# Patient Record
Sex: FEMALE | Race: OTHER | HISPANIC OR LATINO | Employment: FULL TIME | URBAN - METROPOLITAN AREA
[De-identification: names, ages, dates, MRNs, and addresses within clinical notes are randomized per-mention and may not be internally consistent; named-entity substitution may affect disease eponyms.]

---

## 2022-02-09 ENCOUNTER — OFFICE VISIT (OUTPATIENT)
Dept: FAMILY MEDICINE CLINIC | Facility: CLINIC | Age: 42
End: 2022-02-09
Payer: COMMERCIAL

## 2022-02-09 VITALS
BODY MASS INDEX: 31.45 KG/M2 | RESPIRATION RATE: 16 BRPM | SYSTOLIC BLOOD PRESSURE: 118 MMHG | HEIGHT: 64 IN | TEMPERATURE: 98 F | DIASTOLIC BLOOD PRESSURE: 70 MMHG | HEART RATE: 80 BPM | WEIGHT: 184.25 LBS

## 2022-02-09 DIAGNOSIS — E66.09 CLASS 1 OBESITY DUE TO EXCESS CALORIES WITHOUT SERIOUS COMORBIDITY WITH BODY MASS INDEX (BMI) OF 31.0 TO 31.9 IN ADULT: ICD-10-CM

## 2022-02-09 DIAGNOSIS — Z00.00 HEALTHCARE MAINTENANCE: Primary | ICD-10-CM

## 2022-02-09 DIAGNOSIS — E55.9 VITAMIN D INSUFFICIENCY: ICD-10-CM

## 2022-02-09 DIAGNOSIS — Z12.31 ENCOUNTER FOR SCREENING MAMMOGRAM FOR MALIGNANT NEOPLASM OF BREAST: ICD-10-CM

## 2022-02-09 PROBLEM — E66.811 CLASS 1 OBESITY DUE TO EXCESS CALORIES WITHOUT SERIOUS COMORBIDITY WITH BODY MASS INDEX (BMI) OF 31.0 TO 31.9 IN ADULT: Status: ACTIVE | Noted: 2022-02-09

## 2022-02-09 PROCEDURE — 3725F SCREEN DEPRESSION PERFORMED: CPT | Performed by: FAMILY MEDICINE

## 2022-02-09 PROCEDURE — 3008F BODY MASS INDEX DOCD: CPT | Performed by: FAMILY MEDICINE

## 2022-02-09 PROCEDURE — 1036F TOBACCO NON-USER: CPT | Performed by: FAMILY MEDICINE

## 2022-02-09 PROCEDURE — 99386 PREV VISIT NEW AGE 40-64: CPT | Performed by: FAMILY MEDICINE

## 2022-02-09 RX ORDER — PHENTERMINE HYDROCHLORIDE 37.5 MG/1
37.5 CAPSULE ORAL EVERY MORNING
Qty: 30 CAPSULE | Refills: 0 | Status: SHIPPED | OUTPATIENT
Start: 2022-02-09 | End: 2022-03-14

## 2022-02-09 NOTE — PROGRESS NOTES
Assessment/Plan:  Healthcare maintenance  It was discussed about immunizations, diet, exercise and safety measures  Class 1 obesity due to excess calories without serious comorbidity with body mass index (BMI) of 31 0 to 31 9 in adult  It was discussed about low carb diet and regular exercise  It was discussed about treatment options  She agreed to start on phentermine 37 5 mg daily  Discussed about possible side effects  Come back in 1 month for follow-up  Diagnoses and all orders for this visit:    Healthcare maintenance  -     CBC and differential; Future  -     Comprehensive metabolic panel; Future  -     Lipid Panel with Direct LDL reflex; Future  -     TSH, 3rd generation with Free T4 reflex; Future    Encounter for screening mammogram for malignant neoplasm of breast  -     Mammo screening bilateral w 3d & cad; Future    Vitamin D insufficiency  -     Vitamin D 25 hydroxy; Future    Class 1 obesity due to excess calories without serious comorbidity with body mass index (BMI) of 31 0 to 31 9 in adult  -     phentermine 37 5 MG capsule; Take 1 capsule (37 5 mg total) by mouth every morning    BMI 31 0-31 9,adult          There are no Patient Instructions on file for this visit  Return in about 1 month (around 3/9/2022)  Subjective:      Patient ID: Karma Mondragon is a 39 y o  female  Chief Complaint   Patient presents with   [facility]     Physical Exam       She is here today as a new patient to establish and for wellness exam   She denies any complain  She has been having problems with weight gain  She does exercise 5 days a week for 1/2 hour  She has not been doing well with her diet  The following portions of the patient's history were reviewed and updated as appropriate: allergies, current medications, past family history, past medical history, past social history, past surgical history and problem list     Review of Systems   Constitutional: Negative for chills and fever  HENT: Negative for trouble swallowing  Eyes: Negative for visual disturbance  Respiratory: Negative for cough and shortness of breath  Cardiovascular: Negative for chest pain, palpitations and leg swelling  Gastrointestinal: Negative for abdominal pain, constipation and diarrhea  Endocrine: Negative for cold intolerance and heat intolerance  Genitourinary: Negative for difficulty urinating and dysuria  Musculoskeletal: Negative for gait problem  Skin: Negative for rash  Neurological: Negative for dizziness, tremors, seizures and headaches  Hematological: Negative for adenopathy  Psychiatric/Behavioral: Negative for behavioral problems  Current Outpatient Medications   Medication Sig Dispense Refill    phentermine 37 5 MG capsule Take 1 capsule (37 5 mg total) by mouth every morning 30 capsule 0     No current facility-administered medications for this visit  Objective:    /70 (BP Location: Left arm, Patient Position: Sitting, Cuff Size: Adult)   Pulse 80   Temp 98 °F (36 7 °C) (Tympanic)   Resp 16   Ht 5' 4" (1 626 m)   Wt 83 6 kg (184 lb 4 oz)   BMI 31 63 kg/m²        Physical Exam  Vitals and nursing note reviewed  Constitutional:       Appearance: Normal appearance  She is well-developed  HENT:      Head: Normocephalic and atraumatic  Eyes:      Pupils: Pupils are equal, round, and reactive to light  Cardiovascular:      Rate and Rhythm: Normal rate and regular rhythm  Heart sounds: Normal heart sounds  Pulmonary:      Effort: Pulmonary effort is normal       Breath sounds: Normal breath sounds  Abdominal:      General: Bowel sounds are normal       Palpations: Abdomen is soft  Musculoskeletal:         General: Normal range of motion  Cervical back: Normal range of motion and neck supple  Lymphadenopathy:      Cervical: No cervical adenopathy  Skin:     General: Skin is warm     Neurological:      Mental Status: She is alert and oriented to person, place, and time  Cranial Nerves: No cranial nerve deficit  Muna Ratliff MD BMI Counseling: Body mass index is 31 63 kg/m²  The BMI is above normal  Nutrition recommendations include reducing portion sizes, decreasing overall calorie intake and 3-5 servings of fruits/vegetables daily  Exercise recommendations include moderate aerobic physical activity for 150 minutes/week

## 2022-02-09 NOTE — ASSESSMENT & PLAN NOTE
It was discussed about low carb diet and regular exercise  It was discussed about treatment options  She agreed to start on phentermine 37 5 mg daily  Discussed about possible side effects  Come back in 1 month for follow-up

## 2022-02-18 ENCOUNTER — HOSPITAL ENCOUNTER (OUTPATIENT)
Dept: MAMMOGRAPHY | Facility: CLINIC | Age: 42
Discharge: HOME | End: 2022-02-18
Payer: COMMERCIAL

## 2022-02-18 DIAGNOSIS — Z12.31 ENCOUNTER FOR SCREENING MAMMOGRAM FOR MALIGNANT NEOPLASM OF BREAST: ICD-10-CM

## 2022-02-18 PROCEDURE — 77063 BREAST TOMOSYNTHESIS BI: CPT

## 2022-02-18 PROCEDURE — 77067 SCR MAMMO BI INCL CAD: CPT

## 2022-03-14 ENCOUNTER — OFFICE VISIT (OUTPATIENT)
Dept: FAMILY MEDICINE CLINIC | Facility: CLINIC | Age: 42
End: 2022-03-14
Payer: COMMERCIAL

## 2022-03-14 VITALS
WEIGHT: 173.13 LBS | RESPIRATION RATE: 16 BRPM | TEMPERATURE: 97.3 F | SYSTOLIC BLOOD PRESSURE: 112 MMHG | DIASTOLIC BLOOD PRESSURE: 60 MMHG | HEIGHT: 64 IN | BODY MASS INDEX: 29.56 KG/M2 | HEART RATE: 64 BPM

## 2022-03-14 DIAGNOSIS — E66.09 CLASS 1 OBESITY DUE TO EXCESS CALORIES WITHOUT SERIOUS COMORBIDITY WITH BODY MASS INDEX (BMI) OF 31.0 TO 31.9 IN ADULT: Primary | ICD-10-CM

## 2022-03-14 DIAGNOSIS — G43.009 MIGRAINE WITHOUT AURA AND WITHOUT STATUS MIGRAINOSUS, NOT INTRACTABLE: ICD-10-CM

## 2022-03-14 PROBLEM — E66.811 OBESITY (BMI 30.0-34.9): Status: ACTIVE | Noted: 2018-02-09

## 2022-03-14 PROBLEM — M54.50 ACUTE MIDLINE LOW BACK PAIN WITHOUT SCIATICA: Status: ACTIVE | Noted: 2020-11-16

## 2022-03-14 PROBLEM — G47.33 SEVERE OBSTRUCTIVE SLEEP APNEA: Status: ACTIVE | Noted: 2018-02-09

## 2022-03-14 PROBLEM — E55.9 VITAMIN D DEFICIENCY: Status: ACTIVE | Noted: 2017-12-04

## 2022-03-14 PROBLEM — Z83.3 FAMILY HISTORY OF DIABETES MELLITUS IN MATERNAL GRANDMOTHER: Status: ACTIVE | Noted: 2020-11-16

## 2022-03-14 PROBLEM — E66.9 OBESITY (BMI 30.0-34.9): Status: ACTIVE | Noted: 2018-02-09

## 2022-03-14 PROBLEM — M26.623 BILATERAL TEMPOROMANDIBULAR JOINT PAIN: Status: ACTIVE | Noted: 2018-06-08

## 2022-03-14 PROBLEM — H81.11 BENIGN POSITIONAL VERTIGO, RIGHT: Status: ACTIVE | Noted: 2020-11-16

## 2022-03-14 PROCEDURE — 1036F TOBACCO NON-USER: CPT | Performed by: FAMILY MEDICINE

## 2022-03-14 PROCEDURE — 99213 OFFICE O/P EST LOW 20 MIN: CPT | Performed by: FAMILY MEDICINE

## 2022-03-14 PROCEDURE — 3008F BODY MASS INDEX DOCD: CPT | Performed by: FAMILY MEDICINE

## 2022-03-14 NOTE — PROGRESS NOTES
Assessment/Plan:    Obesity (BMI 30 0-34  9)  Encouraged lifestyle and dietary modifications  Phentermine discontinued due to patient insomnia  Time allowed for questions, patient agreed with plan  Follow up in 3 months  Migraine without aura and without status migrainosus, not intractable  Stable without meds  Will continue to monitor  Problem List Items Addressed This Visit        Cardiovascular and Mediastinum    Migraine without aura and without status migrainosus, not intractable     Stable without meds  Will continue to monitor  Other    Obesity (BMI 30 0-34 9) - Primary     Encouraged lifestyle and dietary modifications  Phentermine discontinued due to patient insomnia  Time allowed for questions, patient agreed with plan  Follow up in 3 months  Subjective:      Patient ID: Chely Morales is a 39 y o  female  Narinder King is a pleasant 39year old female presenting for 1 month follow up for weight loss  Patient has lost 11 pounds since last appointment  Was previously prescribed phentermine but states she was unable to sleep after taking this medication 1 time, so she is no longer using it  No acute complaints today  The following portions of the patient's history were reviewed and updated as appropriate: allergies, current medications, past family history, past medical history, past social history, past surgical history and problem list     Review of Systems   Respiratory: Negative for shortness of breath  Cardiovascular: Negative for chest pain, palpitations and leg swelling  Gastrointestinal: Negative for abdominal pain  Neurological: Negative for dizziness, weakness and headaches  All other systems reviewed and are negative  Objective: Pleasant female in NAD  Alert and coherent  Good and reliable historian      /60 (BP Location: Left arm, Patient Position: Sitting, Cuff Size: Adult)   Pulse 64   Temp (!) 97 3 °F (36 3 °C) (Tympanic)   Resp 16 Ht 5' 4" (1 626 m)   Wt 78 5 kg (173 lb 2 oz)   BMI 29 72 kg/m²      Physical Exam  Vitals reviewed  Constitutional:       General: She is not in acute distress  Appearance: Normal appearance  She is not toxic-appearing  HENT:      Head: Normocephalic and atraumatic  Eyes:      Extraocular Movements: Extraocular movements intact  Conjunctiva/sclera: Conjunctivae normal    Cardiovascular:      Rate and Rhythm: Normal rate and regular rhythm  Pulmonary:      Effort: Pulmonary effort is normal       Breath sounds: Normal breath sounds  Musculoskeletal:         General: Normal range of motion  Skin:     General: Skin is warm and dry  Neurological:      General: No focal deficit present  Mental Status: She is alert     Psychiatric:         Mood and Affect: Mood normal

## 2022-03-14 NOTE — ASSESSMENT & PLAN NOTE
Encouraged lifestyle and dietary modifications  Phentermine discontinued due to patient insomnia  Time allowed for questions, patient agreed with plan  Follow up in 3 months

## 2022-06-22 ENCOUNTER — OFFICE VISIT (OUTPATIENT)
Dept: OBGYN CLINIC | Facility: CLINIC | Age: 42
End: 2022-06-22
Payer: COMMERCIAL

## 2022-06-22 VITALS
BODY MASS INDEX: 26.29 KG/M2 | HEIGHT: 64 IN | DIASTOLIC BLOOD PRESSURE: 70 MMHG | HEART RATE: 60 BPM | TEMPERATURE: 97.9 F | WEIGHT: 154 LBS | SYSTOLIC BLOOD PRESSURE: 96 MMHG

## 2022-06-22 DIAGNOSIS — Z30.2 REQUEST FOR STERILIZATION: ICD-10-CM

## 2022-06-22 DIAGNOSIS — Z01.419 ENCOUNTER FOR ANNUAL ROUTINE GYNECOLOGICAL EXAMINATION: Primary | ICD-10-CM

## 2022-06-22 DIAGNOSIS — Z12.4 SCREENING FOR CERVICAL CANCER: ICD-10-CM

## 2022-06-22 DIAGNOSIS — D21.9 FIBROID: ICD-10-CM

## 2022-06-22 DIAGNOSIS — Z12.31 ENCOUNTER FOR SCREENING MAMMOGRAM FOR BREAST CANCER: ICD-10-CM

## 2022-06-22 PROCEDURE — 99386 PREV VISIT NEW AGE 40-64: CPT | Performed by: CLINICAL NURSE SPECIALIST

## 2022-06-22 PROCEDURE — G0476 HPV COMBO ASSAY CA SCREEN: HCPCS | Performed by: CLINICAL NURSE SPECIALIST

## 2022-06-22 PROCEDURE — 1036F TOBACCO NON-USER: CPT | Performed by: CLINICAL NURSE SPECIALIST

## 2022-06-22 PROCEDURE — 3008F BODY MASS INDEX DOCD: CPT | Performed by: CLINICAL NURSE SPECIALIST

## 2022-06-22 PROCEDURE — G0145 SCR C/V CYTO,THINLAYER,RESCR: HCPCS | Performed by: CLINICAL NURSE SPECIALIST

## 2022-06-22 RX ORDER — DIPHENOXYLATE HYDROCHLORIDE AND ATROPINE SULFATE 2.5; .025 MG/1; MG/1
1 TABLET ORAL DAILY
COMMUNITY

## 2022-06-22 NOTE — PROGRESS NOTES
Subjective:        Nafisa Bach is a 43 y o  female  Here for Establish Care (New pt) and Gynecologic Exam (Last PAP approx 2015, normal; pt states she has fibroids, no sx from them)      GYN HPI  Here for annual gyn exam  Regarding menstrual cycle: Patient denies menstrual complaints  Regular monthly menses, not excessive  Slightly irregular- varies by up to 2 weeks  She denies any abnormal vaginal complaints; and denies any abnormal urinary complaints    Denies changes or concerns r/t breasts  She sometimes performs self breast exams  She reports she generally eats a healthy diet and does exercise regularly  She does get dietary calcium/vit D  She denies any untreated or poorly controlled anxiety or depression sxs  She is sexually active  Contraception: rhythm method , She denies issues with intimacy  DESIRES PERMANENT STERILIZATION  She reports that she does feel safe at home  The following portions of the patient's history were reviewed and updated as appropriate: allergies, current medications, past family history, past medical history, past social history, past surgical history, and problem list       Hereditary Cancer Screening  Family history reviewed and patient does not meet criteria for referral for genetic cancer assessment  Substance Abuse Screening Completed  See hx and flowsheet  Screens Positive for none       HEALTH MAINTENANCE SCREENINGS:    History of abnormal pap: No, Last Papanicolaou test:  Not on file  History of abnormal mammogram: No, Last mammogram:  02/18/2022        Review of Systems   Constitutional: Negative for appetite change, chills, fatigue, fever and unexpected weight change  HENT: Negative  Eyes: Negative  Respiratory: Negative for chest tightness and shortness of breath  Cardiovascular: Negative for chest pain and palpitations  Gastrointestinal: Negative for abdominal pain, constipation and vomiting     Endocrine: Negative for cold intolerance and heat intolerance  Genitourinary:        As per HPI   Musculoskeletal: Negative for back pain, joint swelling and neck pain  Skin: Negative for color change and rash  Neurological: Negative for dizziness, weakness and numbness  Hematological: Does not bruise/bleed easily  Psychiatric/Behavioral: Negative  Objective:  BP 96/70 (BP Location: Left arm, Patient Position: Sitting, Cuff Size: Adult)   Pulse 60   Temp 97 9 °F (36 6 °C) (Tympanic)   Ht 5' 4" (1 626 m)   Wt 69 9 kg (154 lb)   LMP 06/16/2022 (Exact Date)   Breastfeeding No   BMI 26 43 kg/m²        Physical Exam  Constitutional:       General: She is not in acute distress  Appearance: Normal appearance  Genitourinary:      Vulva and rectum normal       No lesions in the vagina  Right Labia: No rash or lesions  Left Labia: No lesions or rash  No vaginal discharge, erythema, tenderness or bleeding  Right Adnexa: not tender and no mass present  Left Adnexa: not tender and no mass present  No cervical motion tenderness, discharge or friability  Uterus is enlarged (16 wks)  Uterus is not tender  No urethral prolapse present  Pelvic exam was performed with patient in the lithotomy position  Breasts: Breasts are symmetrical       Right: No inverted nipple, mass, nipple discharge, skin change or tenderness  Left: No inverted nipple, mass, nipple discharge, skin change or tenderness  HENT:      Head: Normocephalic and atraumatic  Cardiovascular:      Rate and Rhythm: Normal rate  Heart sounds: No murmur heard  Pulmonary:      Effort: Pulmonary effort is normal       Breath sounds: Normal breath sounds  Abdominal:      General: There is no distension  Palpations: Abdomen is soft  Tenderness: There is no abdominal tenderness  Musculoskeletal:         General: Normal range of motion  Cervical back: Normal range of motion     Lymphadenopathy: Cervical: No cervical adenopathy  Neurological:      Mental Status: She is alert and oriented to person, place, and time  Skin:     General: Skin is warm and dry  Psychiatric:         Mood and Affect: Mood normal          Behavior: Behavior normal    Vitals reviewed  Assessment/Plan:           Soila Hale- Primary  Annual GYN examination completed today  Risk prevention and anticipatory guidance provided including:   Pap/breast screenings- see below   Risk for hereditary cancers: Family history reviewed and patient does not qualify for referral for genetics consult/testing  Referral to genetics oncology offered as indicated   Calcium and vitamin D supplementation   Risk factors for lipid, diabetes and thryroid screening, ordered if needed   Dietary and lifestyle recommendations based on her age and weight  body mass index is 26 43 kg/m²  [employer] PHQ-2 depression screen completed  Reviewed and interventions recommended if needed   Tobacco and alcohol use, intervention ordered if applicable  Cervical cancer screening  Previous pap smears and ASCCP screening guidelines have been reviewed  Pap smear is indicated at this time  Contraception   declines   DESIRES PERMANENT STERILIZATION- will schedule pre-op consult    STI Screening  STI screening is declined  STI prevention discussed with use of condoms  Breast exam and breast cancer screening  Reviewed recommendation for yearly screening mammogram (as per ACOG, ACS, and [address] [place] [address] Departments), however, also made her aware that there are other professional organizations that may recommend deferring mammograms until age 48 or screening every other year  CBE should still be done yearly, but may miss some cancers and alone is not as effective as breast imaging  Supplemental testing may also be indicated based on lifetime risk for breast cancer [facility] , She is up to date with screening;  Next due 2/23    Problem List Items Addressed This Visit    None     Visit Diagnoses     Encounter for annual routine gynecological examination    -  Primary    Fibroid        Hx  No c/o   Just no US recently to assess  US ordered  Relevant Orders    US pelvis complete w transvaginal    Encounter for screening mammogram for breast cancer        Relevant Orders    Mammo screening bilateral w 3d & cad    Request for sterilization        will schedule Pre-op appt   desires permanent sterilization    Screening for cervical cancer        Relevant Orders    Liquid-based pap, screening          Orders Placed This Encounter   Procedures    US pelvis complete w transvaginal    Mammo screening bilateral w 3d & cad

## 2022-06-23 ENCOUNTER — HOSPITAL ENCOUNTER (OUTPATIENT)
Dept: ULTRASOUND IMAGING | Facility: HOSPITAL | Age: 42
Discharge: HOME | End: 2022-06-23
Payer: COMMERCIAL

## 2022-06-23 DIAGNOSIS — D21.9 FIBROID: ICD-10-CM

## 2022-06-23 LAB
HPV HR 12 DNA CVX QL NAA+PROBE: NEGATIVE
HPV16 DNA CVX QL NAA+PROBE: NEGATIVE
HPV18 DNA CVX QL NAA+PROBE: NEGATIVE

## 2022-06-23 PROCEDURE — 76830 TRANSVAGINAL US NON-OB: CPT

## 2022-06-23 PROCEDURE — 76856 US EXAM PELVIC COMPLETE: CPT

## 2022-06-28 LAB
LAB AP GYN PRIMARY INTERPRETATION: NORMAL
Lab: NORMAL

## 2022-06-28 NOTE — RESULT ENCOUNTER NOTE
US confirms Fibroid  9+cm  It is large and we do not have records  Would like to compare to a previous one if possible  See if we can obtain a prior US please  If not will repeat US in 6 months

## 2022-07-23 ENCOUNTER — OFFICE VISIT (OUTPATIENT)
Dept: URGENT CARE | Age: 42
End: 2022-07-23
Payer: COMMERCIAL

## 2022-07-23 VITALS
TEMPERATURE: 97.5 F | WEIGHT: 145 LBS | OXYGEN SATURATION: 99 % | HEART RATE: 67 BPM | HEIGHT: 64 IN | BODY MASS INDEX: 24.75 KG/M2 | RESPIRATION RATE: 18 BRPM

## 2022-07-23 DIAGNOSIS — H60.391 OTHER INFECTIVE ACUTE OTITIS EXTERNA OF RIGHT EAR: Primary | ICD-10-CM

## 2022-07-23 DIAGNOSIS — H65.191 OTHER NON-RECURRENT ACUTE NONSUPPURATIVE OTITIS MEDIA OF RIGHT EAR: ICD-10-CM

## 2022-07-23 PROCEDURE — 99213 OFFICE O/P EST LOW 20 MIN: CPT | Performed by: PHYSICIAN ASSISTANT

## 2022-07-23 RX ORDER — FLUTICASONE PROPIONATE 50 MCG
2 SPRAY, SUSPENSION (ML) NASAL DAILY
Qty: 16 G | Refills: 0 | Status: SHIPPED | OUTPATIENT
Start: 2022-07-23 | End: 2022-08-24

## 2022-07-23 RX ORDER — BENZONATATE 200 MG/1
200 CAPSULE ORAL 3 TIMES DAILY PRN
Qty: 20 CAPSULE | Refills: 0 | Status: SHIPPED | OUTPATIENT
Start: 2022-07-23 | End: 2022-08-24

## 2022-07-23 RX ORDER — CETIRIZINE HYDROCHLORIDE 10 MG/1
10 TABLET ORAL DAILY
Qty: 30 TABLET | Refills: 0 | Status: SHIPPED | OUTPATIENT
Start: 2022-07-23 | End: 2022-08-24

## 2022-07-23 RX ORDER — AMOXICILLIN 875 MG/1
875 TABLET, COATED ORAL 2 TIMES DAILY
Qty: 14 TABLET | Refills: 0 | Status: SHIPPED | OUTPATIENT
Start: 2022-07-23 | End: 2022-07-30

## 2022-07-23 RX ORDER — OFLOXACIN 3 MG/ML
10 SOLUTION AURICULAR (OTIC) DAILY
Qty: 5 ML | Refills: 0 | Status: SHIPPED | OUTPATIENT
Start: 2022-07-23 | End: 2022-08-24

## 2022-07-23 NOTE — PATIENT INSTRUCTIONS
Use ear drops as directed for next 7 days  Take antibiotics as directed until completed  Use additional medications as needed for symptomatic relief  Motrin and/or Tylenol as needed for fevers aches and pains  Follow up with PCP in 3-5 days  Proceed to  ER if symptoms worsen  Ear Infection   AMBULATORY CARE:   An ear infection  is also called otitis media  Blocked or swollen eustachian tubes can cause an infection  Eustachian tubes connect the middle ear to the back of the nose and throat  They drain fluid from the middle ear  You may have a buildup of fluid in your ear  Germs build up in the fluid and infection develops  Common signs and symptoms:   Ear pain    Fever or a headache    Trouble hearing    Ringing or buzzing in your ear    Plugged ear or an ear that feels full    Dizziness    Nausea or vomiting    Seek care immediately if:   You have clear fluid coming from your ear  You have a stiff neck, headache, and a fever  Call your doctor if:   You see blood or pus draining from your ear  Your ear pain gets worse or does not go away, even after treatment  The outside of your ear is red or swollen  You are vomiting or have diarrhea  You have questions or concerns about your condition or care  Medicines: You may  need any of the following:  Acetaminophen  decreases pain and fever  It is available without a doctor's order  Ask how much to take and how often to take it  Follow directions  Read the labels of all other medicines you are using to see if they also contain acetaminophen, or ask your doctor or pharmacist  Acetaminophen can cause liver damage if not taken correctly  Do not use more than 4 grams (4,000 milligrams) total of acetaminophen in one day  NSAIDs , such as ibuprofen, help decrease swelling, pain, and fever  This medicine is available with or without a doctor's order  NSAIDs can cause stomach bleeding or kidney problems in certain people   If you take blood thinner medicine, always ask your healthcare provider if NSAIDs are safe for you  Always read the medicine label and follow directions  Ear drops  may contain medicine to decrease pain and inflammation  Antibiotics  help treat a bacterial infection  Self-care:   Apply heat  on your ear for 15 to 20 minutes, 3 to 4 times a day or as directed  You can apply heat with an electric heating pad, hot water bottle, or warm compress  Always put a cloth between your skin and the heat pack to prevent burns  Heat helps decrease pain  Apply ice  on your ear for 15 to 20 minutes, 3 to 4 times a day for 2 days or as directed  Use an ice pack, or put crushed ice in a plastic bag  Cover it with a towel before you apply it to your ear  Ice decreases swelling and pain  Prevent an ear infection:   Wash your hands often  to help prevent the spread of germs  Ask everyone in your house to wash their hands with soap and water  Ask them to wash after they use the bathroom or change a diaper  Remind them to wash before they prepare or eat food  Stay away from people who are ill  Some germs spread easily and quickly through contact  Follow up with your doctor as directed:  Write down your questions so you remember to ask them during your visits  © Copyright [employer] 2022 Information is for End User's use only and may not be sold, redistributed or otherwise used for commercial purposes  All illustrations and images included in CareNotes® are the copyrighted property of A D A M [employer] Inc  or Emilia Hunter   The above information is an  only  It is not intended as medical advice for individual conditions or treatments  Talk to your doctor, nurse or pharmacist before following any medical regimen to see if it is safe and effective for you        Swimmer's Ear   AMBULATORY CARE:   Swimmer's ear , also called otitis externa, is an infection in the outer ear canal  This canal goes from the outside of your ear to your eardrum  Swimmer's ear most often occurs when water remains in your ear after you swim  This creates a moist area for bacteria to grow  Scratches or damage from your fingers, cotton swabs, or other objects can also cause swimmer's ear  Common signs and symptoms:   Ear pain    Red, swollen, itchy ear canal    Fluid or pus draining from your ear    A plugged ear and trouble hearing    Seek care immediately if:   You have severe ear pain  You are suddenly not able to hear  You have new swelling in your face, behind your ears, or in your neck  You suddenly cannot move part of your face, or it feels numb  Call your doctor if:   You have a fever  Your symptoms get worse or do not go away, even after treatment  You have questions or concerns about your condition or care  Treatment for swimmer's ear  may include cleaning your outer ear canal first  This will help clean any ear wax, flaky skin, or other discharge  You may then need any of the following:  Medicines:      Ear drops  help fight infection and decrease redness and swelling  Acetaminophen  decreases pain and fever  It is available without a doctor's order  Ask how much to take and how often to take it  Follow directions  Read the labels of all other medicines you are using to see if they also contain acetaminophen, or ask your doctor or pharmacist  Acetaminophen can cause liver damage if not taken correctly  Do not use more than 4 grams (4,000 milligrams) total of acetaminophen in one day  NSAIDs , such as ibuprofen, help decrease swelling, pain, and fever  This medicine is available with or without a doctor's order  NSAIDs can cause stomach bleeding or kidney problems in certain people  If you take blood thinner medicine, always ask your healthcare provider if NSAIDs are safe for you  Always read the medicine label and follow directions  An ear wick  may be used if your ear canal is blocked   A wick (small tube) made of cotton or gauze is placed in your ear  The wick helps pull extra fluid out of your ear canal  Tip also help draw medicine into your ear canal     How to use ear drops:   Warm the bottle of ear drops in your hands  for a few minutes  Lie down on your side with your infected ear facing up  This will help the medicine travel completely through your ear canal     Gently pull the ear up and back  Carefully drip the correct number of ear drops into your ear  Have another person help you if possible  For children younger than 3 years,  gently pull and hold the ear down and back  For children older than 3 years,  gently pull and hold the ear up and back  Stay in the same position  for 3 to 5 minutes to let the medicine soak in  Prevent swimmer's ear:   Dry your ears completely after you swim or bathe  Gently wipe your outer ear with a soft towel or cloth  Use ear plugs when you swim  Do not put cotton swabs or other objects in your ears  These can scratch or damage your ear  They can also push ear wax deeper in and irritate your ear  Put cotton balls gently in your outer ear  when you apply hair spray, hair dye, or perfume  Follow up with your doctor as directed:  Write down your questions so you remember to ask them during your visits  © Copyright [employer] 2022 Information is for End User's use only and may not be sold, redistributed or otherwise used for commercial purposes  All illustrations and images included in CareNotes® are the copyrighted property of A D A M [employer] Inc  or Emilia Hill  The above information is an  only  It is not intended as medical advice for individual conditions or treatments  Talk to your doctor, nurse or pharmacist before following any medical regimen to see if it is safe and effective for you

## 2022-07-23 NOTE — PROGRESS NOTES
[facility]        NAME: Fe Nino is a 43 y o  female  : 1980    MRN: [id number]  DATE: 2022  TIME: 10:01 AM    Assessment and Plan   Other infective acute otitis externa of right ear [H60 391]  1  Other infective acute otitis externa of right ear  ofloxacin (FLOXIN) 0 3 % otic solution   2  Other non-recurrent acute nonsuppurative otitis media of right ear  amoxicillin (AMOXIL) 875 mg tablet    fluticasone (FLONASE) 50 mcg/act nasal spray    cetirizine (ZyrTEC) 10 mg tablet    benzonatate (TESSALON) 200 MG capsule         Patient Instructions     Use ear drops as directed for next 7 days  Take antibiotics as directed until completed  Use additional medications as needed for symptomatic relief  Motrin and/or Tylenol as needed for fevers aches and pains  Follow up with PCP in 3-5 days  Proceed to  ER if symptoms worsen  Chief Complaint     Chief Complaint   Patient presents with    Nasal Congestion     Nasal congestion, sore throat, b/l ear pain x 1 week         History of Present Illness       28-year-old female presents with greater than 1 week right ear pain and discomfort now having some drainage from the right ear  Also having some runny nose congestion and cough  Sore throat is also reported  Denies any chest pain shortness of breath  No abdominal pain nausea vomiting diarrhea  Earache   There is pain in the right ear  This is a new problem  The current episode started 1 to 4 weeks ago  The problem occurs constantly  The problem has been waxing and waning  There has been no fever  The pain is moderate  Associated symptoms include coughing, ear discharge, headaches, rhinorrhea and a sore throat  Pertinent negatives include no abdominal pain, diarrhea, hearing loss, neck pain or vomiting  She has tried acetaminophen for the symptoms  The treatment provided no relief  Review of Systems   Review of Systems   Constitutional: Negative  Negative for fatigue and fever  HENT: Positive for ear discharge, ear pain, rhinorrhea and sore throat  Negative for hearing loss  Eyes: Negative  Respiratory: Positive for cough  Cardiovascular: Negative  Gastrointestinal: Negative  Negative for abdominal pain, diarrhea and vomiting  Musculoskeletal: Negative  Negative for neck pain  Skin: Negative  Neurological: Positive for headaches           Current Medications       Current Outpatient Medications:     amoxicillin (AMOXIL) 875 mg tablet, Take 1 tablet (875 mg total) by mouth 2 (two) times a day for 7 days, Disp: 14 tablet, Rfl: 0    benzonatate (TESSALON) 200 MG capsule, Take 1 capsule (200 mg total) by mouth 3 (three) times a day as needed for cough, Disp: 20 capsule, Rfl: 0    Calcium Carbonate (CALCIUM 600 PO), Take 1 tablet by mouth daily, Disp: , Rfl:     cetirizine (ZyrTEC) 10 mg tablet, Take 1 tablet (10 mg total) by mouth daily, Disp: 30 tablet, Rfl: 0    Cholecalciferol (Vitamin D3) 125 MCG (5000 UT) TABS, Take 5,000 Units by mouth daily, Disp: , Rfl:     fluticasone (FLONASE) 50 mcg/act nasal spray, 2 sprays into each nostril daily, Disp: 16 g, Rfl: 0    Magnesium 400 MG CAPS, Take 1 capsule by mouth daily, Disp: , Rfl:     multivitamin (THERAGRAN) TABS, Take 1 tablet by mouth daily, Disp: , Rfl:     ofloxacin (FLOXIN) 0 3 % otic solution, Administer 10 drops to the right ear daily, Disp: 5 mL, Rfl: 0    Current Allergies     Allergies as of 2022    (No Known Allergies)            The following portions of the patient's history were reviewed and updated as appropriate: allergies, current medications, past family history, past medical history, past social history, past surgical history and problem list      Past Medical History:   Diagnosis Date    Fibroids        Past Surgical History:   Procedure Laterality Date    ANKLE SURGERY       SECTION         Family History   Problem Relation Age of Onset    Thyroid disease Mother    Nael Spangler Diabetes Maternal Grandmother     Diabetes Paternal Grandmother     Heart disease Paternal Grandfather     No Known Problems Sister     No Known Problems Maternal Grandfather     No Known Problems Paternal Aunt     No Known Problems Paternal Aunt     No Known Problems Paternal Aunt          Medications have been verified  Objective   Pulse 67   Temp 97 5 °F (36 4 °C)   Resp 18   Ht 5' 4" (1 626 m)   Wt 65 8 kg (145 lb)   SpO2 99%   BMI 24 89 kg/m²   No LMP recorded  Physical Exam     Physical Exam  Vitals and nursing note reviewed  Constitutional:       General: She is not in acute distress  Appearance: Normal appearance  She is well-developed  HENT:      Head: Normocephalic and atraumatic  Right Ear: Hearing, ear canal and external ear normal  Drainage, swelling and tenderness present  A middle ear effusion is present  There is no impacted cerumen  Tympanic membrane is bulging  Left Ear: Hearing, tympanic membrane, ear canal and external ear normal  There is no impacted cerumen  Nose: Congestion and rhinorrhea present  Mouth/Throat:      Lips: Pink  Mouth: Mucous membranes are moist       Pharynx: Uvula midline  Posterior oropharyngeal erythema (Mild) present  No oropharyngeal exudate  Eyes:      General:         Right eye: No discharge  Left eye: No discharge  Conjunctiva/sclera: Conjunctivae normal    Cardiovascular:      Rate and Rhythm: Normal rate and regular rhythm  Heart sounds: Normal heart sounds  No murmur heard  Pulmonary:      Effort: Pulmonary effort is normal  No respiratory distress  Breath sounds: Normal breath sounds  No wheezing or rales  Abdominal:      General: Bowel sounds are normal       Palpations: Abdomen is soft  Tenderness: There is no abdominal tenderness  Musculoskeletal:         General: Normal range of motion  Cervical back: Normal range of motion and neck supple     Lymphadenopathy: Cervical: No cervical adenopathy  Skin:     General: Skin is warm and dry  Neurological:      Mental Status: She is alert and oriented to person, place, and time     Psychiatric:         Mood and Affect: Mood normal

## 2022-07-29 ENCOUNTER — OFFICE VISIT (OUTPATIENT)
Dept: OBGYN CLINIC | Facility: CLINIC | Age: 42
End: 2022-07-29
Payer: COMMERCIAL

## 2022-07-29 VITALS
HEIGHT: 65 IN | WEIGHT: 153.4 LBS | TEMPERATURE: 99.6 F | OXYGEN SATURATION: 99 % | SYSTOLIC BLOOD PRESSURE: 110 MMHG | HEART RATE: 61 BPM | DIASTOLIC BLOOD PRESSURE: 74 MMHG | BODY MASS INDEX: 25.56 KG/M2

## 2022-07-29 DIAGNOSIS — D25.9 UTERINE LEIOMYOMA, UNSPECIFIED LOCATION: ICD-10-CM

## 2022-07-29 DIAGNOSIS — Z30.09 STERILIZATION CONSULT: Primary | ICD-10-CM

## 2022-07-29 PROCEDURE — 99214 OFFICE O/P EST MOD 30 MIN: CPT | Performed by: OBSTETRICS & GYNECOLOGY

## 2022-07-29 RX ORDER — SODIUM CHLORIDE, SODIUM LACTATE, POTASSIUM CHLORIDE, CALCIUM CHLORIDE 600; 310; 30; 20 MG/100ML; MG/100ML; MG/100ML; MG/100ML
125 INJECTION, SOLUTION INTRAVENOUS CONTINUOUS
Status: CANCELLED | OUTPATIENT
Start: 2022-09-01

## 2022-07-29 NOTE — PROGRESS NOTES
Subjective   Patient ID: Liam Fleming is a 43 y o  female  Patient is here for a problem visit  Chief Complaint   Patient presents with    Consult     Discuss sterilization     Not using BCM at the moment  - withdrawal method only   Fibroid found incidentally during pregnancy  Menses are not particularly painful  Regular cycles     Works from home       Menstrual History:  OB History        3    Para   3    Term   3            AB        Living   3       SAB        IAB        Ectopic        Multiple        Live Births                      Patient's last menstrual period was 07/15/2022 (approximate)           Past Medical History:   Diagnosis Date    Fibroids        Past Surgical History:   Procedure Laterality Date    ANKLE SURGERY       SECTION      x1    MOLE REMOVAL      chest    WISDOM TOOTH EXTRACTION Bilateral        Social History     Tobacco Use    Smoking status: Never Smoker    Smokeless tobacco: Never Used   Vaping Use    Vaping Use: Never used   Substance Use Topics    Alcohol use: Yes     Comment: socially    Drug use: Never        No Known Allergies      Current Outpatient Medications:     amoxicillin (AMOXIL) 875 mg tablet, Take 1 tablet (875 mg total) by mouth 2 (two) times a day for 7 days, Disp: 14 tablet, Rfl: 0    Calcium Carbonate (CALCIUM 600 PO), Take 1 tablet by mouth daily, Disp: , Rfl:     Cholecalciferol (Vitamin D3) 125 MCG (5000 UT) TABS, Take 5,000 Units by mouth daily, Disp: , Rfl:     Magnesium 400 MG CAPS, Take 1 capsule by mouth daily, Disp: , Rfl:     multivitamin (THERAGRAN) TABS, Take 1 tablet by mouth daily, Disp: , Rfl:     benzonatate (TESSALON) 200 MG capsule, Take 1 capsule (200 mg total) by mouth 3 (three) times a day as needed for cough (Patient not taking: No sig reported), Disp: 20 capsule, Rfl: 0    cetirizine (ZyrTEC) 10 mg tablet, Take 1 tablet (10 mg total) by mouth daily (Patient not taking: No sig reported), Disp: 30 tablet, Rfl: 0    fluticasone (FLONASE) 50 mcg/act nasal spray, 2 sprays into each nostril daily (Patient not taking: No sig reported), Disp: 16 g, Rfl: 0    ofloxacin (FLOXIN) 0 3 % otic solution, Administer 10 drops to the right ear daily (Patient not taking: No sig reported), Disp: 5 mL, Rfl: 0      Review of Systems   Constitutional: Negative for appetite change, chills and fever  Eyes: Negative for visual disturbance  Respiratory: Negative for cough, chest tightness and shortness of breath  Cardiovascular: Negative for chest pain  Gastrointestinal: Negative for abdominal distention, abdominal pain, constipation, diarrhea, nausea and vomiting  Endocrine: Negative for cold intolerance and heat intolerance  Genitourinary: Negative for difficulty urinating, dyspareunia, dysuria, frequency, genital sores, pelvic pain, urgency, vaginal bleeding, vaginal discharge and vaginal pain  Musculoskeletal: Negative for arthralgias  Neurological: Negative for light-headedness and headaches  Hematological: Does not bruise/bleed easily  Psychiatric/Behavioral: Negative for behavioral problems  All other systems reviewed and are negative  /74 (BP Location: Right arm, Patient Position: Sitting, Cuff Size: Adult)   Pulse 61   Temp 99 6 °F (37 6 °C) (Tympanic)   Ht 5' 5" (1 651 m)   Wt 69 6 kg (153 lb 6 4 oz)   LMP 07/15/2022 (Approximate)   SpO2 99%   Breastfeeding No   BMI 25 53 kg/m²       Physical Exam  Constitutional:       General: She is not in acute distress  Appearance: Normal appearance  She is not ill-appearing  Genitourinary:      Bladder and urethral meatus normal       Right Labia: No rash, tenderness, lesions or skin changes  Left Labia: No tenderness, lesions, skin changes or rash  No labial fusion noted  No inguinal adenopathy present in the right or left side  No vaginal discharge, erythema, tenderness, bleeding or ulceration          Right Adnexa: not tender, not full and no mass present  Left Adnexa: not tender, not full and no mass present  No cervical motion tenderness, discharge, friability, lesion, polyp or eversion  Uterus is enlarged  Uterus is not fixed, tender or irregular  No uterine mass detected  Uterus exam comments: 9 cm fibroid palpable just below umbilicus, mobile   Uterus is anteverted  Pelvic exam was performed with patient in the lithotomy position  HENT:      Head: Normocephalic  Cardiovascular:      Rate and Rhythm: Normal rate  Heart sounds: Normal heart sounds  Pulmonary:      Effort: Pulmonary effort is normal  No accessory muscle usage or respiratory distress  Abdominal:      General: There is no distension  Palpations: Abdomen is soft  There is no mass  Tenderness: There is no abdominal tenderness  There is no guarding or rebound  Musculoskeletal:         General: Normal range of motion  Cervical back: No rigidity  Lymphadenopathy:      Lower Body: No right inguinal adenopathy  No left inguinal adenopathy  Neurological:      General: No focal deficit present  Mental Status: She is alert  Mental status is at baseline  Skin:     General: Skin is warm and dry  Psychiatric:         Mood and Affect: Mood normal          Behavior: Behavior normal    Vitals and nursing note reviewed  Exam conducted with a chaperone present  Appropriate laboratory testing, imaging studies, and prior external records were reviewed:     Assessment/Plan:       Problem List Items Addressed This Visit        Genitourinary    Uterine fibroid     Recent US with right fundal/subserosal 8 9 x 9 1 x 9 4 cm and posterior intramural fibroid measuring 1 8 x 2 2 x 1 7 cm    Stable in size from 2015 US from previous practice  Discussed possibility of difficulty with visualization during surgery although I do not anticipate significant issues with this             Other    Sterilization consult - Primary     Patient counseled on risks benefits and alternatives to permanent sterilization  She was counseled on long-acting reversible contraception as well as permanent sterilization for her male partner  She declined these alternatives  She was counseled on risks associated with laparoscopic sterilization including risk from general anesthesia, infection, blood loss, injury to bowel bladder, ureter or risk of laparotomy, risk of pregnancy/failure rate of procedure of approximately 5-7/1000, risk of ectopic pregnancy, and risk of regret  She was counseled on laparoscopic bilateral salpingectomy to reduce her risk of ovarian cancer and accepts this     Advised that she must use condoms consistently prior to surgery   MA-31 not needed  OR consents signed  Tentatively scheduled 9/1 afternoon   Discussed need for PAT ahead of time          Relevant Orders    Case request operating room: SALPINGECTOMY, LAPAROSCOPIC (Completed)    CBC and Platelet    Type and screen

## 2022-07-29 NOTE — PATIENT INSTRUCTIONS
[facility]: for up to date hours, call [phone] ([phone]  [facility] org/lab     Must get labwork done for surgery no more than 72 hours prior to surgery

## 2022-07-29 NOTE — ASSESSMENT & PLAN NOTE
Recent US with right fundal/subserosal 8 9 x 9 1 x 9 4 cm and posterior intramural fibroid measuring 1 8 x 2 2 x 1 7 cm    Stable in size from 2015 US from previous practice  Discussed possibility of difficulty with visualization during surgery although I do not anticipate significant issues with this

## 2022-07-29 NOTE — H&P
Subjective   Patient ID: Remedios Zhang is a 43 y o  female  Patient is here for a problem visit  Chief Complaint   Patient presents with    Consult     Discuss sterilization     Not using BCM at the moment  - withdrawal method only   Fibroid found incidentally during pregnancy  Menses are not particularly painful  Regular cycles     Works from home       Menstrual History:  OB History        3    Para   3    Term   3            AB        Living   3       SAB        IAB        Ectopic        Multiple        Live Births                      Patient's last menstrual period was 07/15/2022 (approximate)           Past Medical History:   Diagnosis Date    Fibroids        Past Surgical History:   Procedure Laterality Date    ANKLE SURGERY       SECTION      x1    MOLE REMOVAL      chest    WISDOM TOOTH EXTRACTION Bilateral        Social History     Tobacco Use    Smoking status: Never Smoker    Smokeless tobacco: Never Used   Vaping Use    Vaping Use: Never used   Substance Use Topics    Alcohol use: Yes     Comment: socially    Drug use: Never        No Known Allergies      Current Outpatient Medications:     amoxicillin (AMOXIL) 875 mg tablet, Take 1 tablet (875 mg total) by mouth 2 (two) times a day for 7 days, Disp: 14 tablet, Rfl: 0    Calcium Carbonate (CALCIUM 600 PO), Take 1 tablet by mouth daily, Disp: , Rfl:     Cholecalciferol (Vitamin D3) 125 MCG (5000 UT) TABS, Take 5,000 Units by mouth daily, Disp: , Rfl:     Magnesium 400 MG CAPS, Take 1 capsule by mouth daily, Disp: , Rfl:     multivitamin (THERAGRAN) TABS, Take 1 tablet by mouth daily, Disp: , Rfl:     benzonatate (TESSALON) 200 MG capsule, Take 1 capsule (200 mg total) by mouth 3 (three) times a day as needed for cough (Patient not taking: No sig reported), Disp: 20 capsule, Rfl: 0    cetirizine (ZyrTEC) 10 mg tablet, Take 1 tablet (10 mg total) by mouth daily (Patient not taking: No sig reported), Disp: 30 tablet, Rfl: 0    fluticasone (FLONASE) 50 mcg/act nasal spray, 2 sprays into each nostril daily (Patient not taking: No sig reported), Disp: 16 g, Rfl: 0    ofloxacin (FLOXIN) 0 3 % otic solution, Administer 10 drops to the right ear daily (Patient not taking: No sig reported), Disp: 5 mL, Rfl: 0      Review of Systems   Constitutional: Negative for appetite change, chills and fever  Eyes: Negative for visual disturbance  Respiratory: Negative for cough, chest tightness and shortness of breath  Cardiovascular: Negative for chest pain  Gastrointestinal: Negative for abdominal distention, abdominal pain, constipation, diarrhea, nausea and vomiting  Endocrine: Negative for cold intolerance and heat intolerance  Genitourinary: Negative for difficulty urinating, dyspareunia, dysuria, frequency, genital sores, pelvic pain, urgency, vaginal bleeding, vaginal discharge and vaginal pain  Musculoskeletal: Negative for arthralgias  Neurological: Negative for light-headedness and headaches  Hematological: Does not bruise/bleed easily  Psychiatric/Behavioral: Negative for behavioral problems  All other systems reviewed and are negative  /74 (BP Location: Right arm, Patient Position: Sitting, Cuff Size: Adult)   Pulse 61   Temp 99 6 °F (37 6 °C) (Tympanic)   Ht 5' 5" (1 651 m)   Wt 69 6 kg (153 lb 6 4 oz)   LMP 07/15/2022 (Approximate)   SpO2 99%   Breastfeeding No   BMI 25 53 kg/m²       Physical Exam  Constitutional:       General: She is not in acute distress  Appearance: Normal appearance  She is not ill-appearing  Genitourinary:      Bladder and urethral meatus normal       Right Labia: No rash, tenderness, lesions or skin changes  Left Labia: No tenderness, lesions, skin changes or rash  No labial fusion noted  No inguinal adenopathy present in the right or left side  No vaginal discharge, erythema, tenderness, bleeding or ulceration          Right Adnexa: not tender, not full and no mass present  Left Adnexa: not tender, not full and no mass present  No cervical motion tenderness, discharge, friability, lesion, polyp or eversion  Uterus is enlarged  Uterus is not fixed, tender or irregular  No uterine mass detected  Uterus exam comments: 9 cm fibroid palpable just below umbilicus, mobile   Uterus is anteverted  Pelvic exam was performed with patient in the lithotomy position  HENT:      Head: Normocephalic  Cardiovascular:      Rate and Rhythm: Normal rate  Heart sounds: Normal heart sounds  Pulmonary:      Effort: Pulmonary effort is normal  No accessory muscle usage or respiratory distress  Abdominal:      General: There is no distension  Palpations: Abdomen is soft  There is no mass  Tenderness: There is no abdominal tenderness  There is no guarding or rebound  Musculoskeletal:         General: Normal range of motion  Cervical back: No rigidity  Lymphadenopathy:      Lower Body: No right inguinal adenopathy  No left inguinal adenopathy  Neurological:      General: No focal deficit present  Mental Status: She is alert  Mental status is at baseline  Skin:     General: Skin is warm and dry  Psychiatric:         Mood and Affect: Mood normal          Behavior: Behavior normal    Vitals and nursing note reviewed  Exam conducted with a chaperone present  Appropriate laboratory testing, imaging studies, and prior external records were reviewed:     Assessment/Plan:       Problem List Items Addressed This Visit        Genitourinary    Uterine fibroid     Recent US with right fundal/subserosal 8 9 x 9 1 x 9 4 cm and posterior intramural fibroid measuring 1 8 x 2 2 x 1 7 cm    Stable in size from 2015 US from previous practice  Discussed possibility of difficulty with visualization during surgery although I do not anticipate significant issues with this             Other    Sterilization consult - Primary     Patient counseled on risks benefits and alternatives to permanent sterilization  She was counseled on long-acting reversible contraception as well as permanent sterilization for her male partner  She declined these alternatives  She was counseled on risks associated with laparoscopic sterilization including risk from general anesthesia, infection, blood loss, injury to bowel bladder, ureter or risk of laparotomy, risk of pregnancy/failure rate of procedure of approximately 5-7/1000, risk of ectopic pregnancy, and risk of regret  She was counseled on laparoscopic bilateral salpingectomy to reduce her risk of ovarian cancer and accepts this     Advised that she must use condoms consistently prior to surgery   ERLIN not needed  OR consents signed  Tentatively scheduled 9/1 afternoon   Discussed need for PAT ahead of time

## 2022-07-29 NOTE — ASSESSMENT & PLAN NOTE
Patient counseled on risks benefits and alternatives to permanent sterilization  She was counseled on long-acting reversible contraception as well as permanent sterilization for her male partner  She declined these alternatives  She was counseled on risks associated with laparoscopic sterilization including risk from general anesthesia, infection, blood loss, injury to bowel bladder, ureter or risk of laparotomy, risk of pregnancy/failure rate of procedure of approximately 5-7/1000, risk of ectopic pregnancy, and risk of regret  She was counseled on laparoscopic bilateral salpingectomy to reduce her risk of ovarian cancer and accepts this     Advised that she must use condoms consistently prior to surgery   MA-31 not needed  OR consents signed  Tentatively scheduled 9/1 afternoon   Discussed need for PAT ahead of time

## 2022-08-09 ENCOUNTER — TELEPHONE (OUTPATIENT)
Dept: OBGYN CLINIC | Facility: CLINIC | Age: 42
End: 2022-08-09

## 2022-08-24 NOTE — PRE-PROCEDURE INSTRUCTIONS
Pre-Surgery Instructions:   Medication Instructions    Calcium Carbonate (CALCIUM 600 PO) Stop taking 7 days prior to surgery   Cholecalciferol (Vitamin D3) 125 MCG (5000 UT) TABS Stop taking 7 days prior to surgery   Magnesium 400 MG CAPS Stop taking 7 days prior to surgery   multivitamin (THERAGRAN) TABS Stop taking 7 days prior to surgery  Spoke with pt via phone  Advised hospital location will call with arrival time night before surgery  NPO after midnight the night before surgery, including chewing gum and hard candy  A small sip of water is allowed to take approved medications the morning of surgery  Non-vaccinated patients and visitors need to remain masked at all times while in the hospital     Instructed to leave contacts/jewelery/valuables at home  Ok to wear dentures, glasses and hearing aides into the hospital - they will be removed for surgery  No smoking or alcohol consumption 24 hours prior to surgery  Avoid all Aspirin products and OTC Vit/Supp 1 week prior to surgery and avoid NSAIDs 3 days prior to surgery per anesthesia instructions  Tylenol ok to take prn  Showering instructions reviewed for night before and morning of surgery using surgical soap or antibacterial soap  Patient verbalized understanding of all of the above, including medication instructions

## 2022-08-27 ENCOUNTER — APPOINTMENT (OUTPATIENT)
Dept: LAB | Age: 42
End: 2022-08-27
Payer: COMMERCIAL

## 2022-08-27 DIAGNOSIS — Z30.09 STERILIZATION CONSULT: ICD-10-CM

## 2022-08-27 LAB
ERYTHROCYTE [DISTWIDTH] IN BLOOD BY AUTOMATED COUNT: 14 % (ref 11.6–15.1)
HCT VFR BLD AUTO: 43.5 % (ref 34.8–46.1)
HGB BLD-MCNC: 13.7 G/DL (ref 11.5–15.4)
MCH RBC QN AUTO: 29.3 PG (ref 26.8–34.3)
MCHC RBC AUTO-ENTMCNC: 31.5 G/DL (ref 31.4–37.4)
MCV RBC AUTO: 93 FL (ref 82–98)
PLATELET # BLD AUTO: 228 THOUSANDS/UL (ref 149–390)
PMV BLD AUTO: 11.2 FL (ref 8.9–12.7)
RBC # BLD AUTO: 4.68 MILLION/UL (ref 3.81–5.12)
WBC # BLD AUTO: 4.95 THOUSAND/UL (ref 4.31–10.16)

## 2022-08-27 PROCEDURE — 86850 RBC ANTIBODY SCREEN: CPT

## 2022-08-27 PROCEDURE — 85027 COMPLETE CBC AUTOMATED: CPT

## 2022-08-27 PROCEDURE — 86901 BLOOD TYPING SEROLOGIC RH(D): CPT

## 2022-08-27 PROCEDURE — 86900 BLOOD TYPING SEROLOGIC ABO: CPT

## 2022-08-27 PROCEDURE — 36415 COLL VENOUS BLD VENIPUNCTURE: CPT

## 2022-08-28 LAB
ABO GROUP BLD: NORMAL
BLD GP AB SCN SERPL QL: NEGATIVE
RH BLD: POSITIVE
SPECIMEN EXPIRATION DATE: NORMAL

## 2022-08-29 PROCEDURE — NC001 PR NO CHARGE: Performed by: OBSTETRICS & GYNECOLOGY

## 2022-08-29 NOTE — H&P
Subjective   Patient ID: Danelle Duran is a 43 y o  female  Patient is here for a problem visit  Chief Complaint   Patient presents with    Consult     Discuss sterilization     Not using BCM at the moment  - withdrawal method only   Fibroid found incidentally during pregnancy  Menses are not particularly painful  Regular cycles     Works from home       Menstrual History:  OB History        3    Para   3    Term   3            AB        Living   3       SAB        IAB        Ectopic        Multiple        Live Births                      Patient's last menstrual period was 07/15/2022 (approximate)           Past Medical History:   Diagnosis Date    Fibroids        Past Surgical History:   Procedure Laterality Date    ANKLE SURGERY       SECTION      x1    MOLE REMOVAL      chest    WISDOM TOOTH EXTRACTION Bilateral        Social History     Tobacco Use    Smoking status: Never Smoker    Smokeless tobacco: Never Used   Vaping Use    Vaping Use: Never used   Substance Use Topics    Alcohol use: Yes     Comment: socially    Drug use: Never        No Known Allergies      Current Outpatient Medications:     amoxicillin (AMOXIL) 875 mg tablet, Take 1 tablet (875 mg total) by mouth 2 (two) times a day for 7 days, Disp: 14 tablet, Rfl: 0    Calcium Carbonate (CALCIUM 600 PO), Take 1 tablet by mouth daily, Disp: , Rfl:     Cholecalciferol (Vitamin D3) 125 MCG (5000 UT) TABS, Take 5,000 Units by mouth daily, Disp: , Rfl:     Magnesium 400 MG CAPS, Take 1 capsule by mouth daily, Disp: , Rfl:     multivitamin (THERAGRAN) TABS, Take 1 tablet by mouth daily, Disp: , Rfl:     benzonatate (TESSALON) 200 MG capsule, Take 1 capsule (200 mg total) by mouth 3 (three) times a day as needed for cough (Patient not taking: No sig reported), Disp: 20 capsule, Rfl: 0    cetirizine (ZyrTEC) 10 mg tablet, Take 1 tablet (10 mg total) by mouth daily (Patient not taking: No sig reported), Disp: 30 tablet, Rfl: 0    fluticasone (FLONASE) 50 mcg/act nasal spray, 2 sprays into each nostril daily (Patient not taking: No sig reported), Disp: 16 g, Rfl: 0    ofloxacin (FLOXIN) 0 3 % otic solution, Administer 10 drops to the right ear daily (Patient not taking: No sig reported), Disp: 5 mL, Rfl: 0      Review of Systems   Constitutional: Negative for appetite change, chills and fever  Eyes: Negative for visual disturbance  Respiratory: Negative for cough, chest tightness and shortness of breath  Cardiovascular: Negative for chest pain  Gastrointestinal: Negative for abdominal distention, abdominal pain, constipation, diarrhea, nausea and vomiting  Endocrine: Negative for cold intolerance and heat intolerance  Genitourinary: Negative for difficulty urinating, dyspareunia, dysuria, frequency, genital sores, pelvic pain, urgency, vaginal bleeding, vaginal discharge and vaginal pain  Musculoskeletal: Negative for arthralgias  Neurological: Negative for light-headedness and headaches  Hematological: Does not bruise/bleed easily  Psychiatric/Behavioral: Negative for behavioral problems  All other systems reviewed and are negative  /74 (BP Location: Right arm, Patient Position: Sitting, Cuff Size: Adult)   Pulse 61   Temp 99 6 °F (37 6 °C) (Tympanic)   Ht 5' 5" (1 651 m)   Wt 69 6 kg (153 lb 6 4 oz)   LMP 07/15/2022 (Approximate)   SpO2 99%   Breastfeeding No   BMI 25 53 kg/m²       Physical Exam  Constitutional:       General: She is not in acute distress  Appearance: Normal appearance  She is not ill-appearing  Genitourinary:      Bladder and urethral meatus normal       Right Labia: No rash, tenderness, lesions or skin changes  Left Labia: No tenderness, lesions, skin changes or rash  No labial fusion noted  No inguinal adenopathy present in the right or left side  No vaginal discharge, erythema, tenderness, bleeding or ulceration          Right Adnexa: not tender, not full and no mass present  Left Adnexa: not tender, not full and no mass present  No cervical motion tenderness, discharge, friability, lesion, polyp or eversion  Uterus is enlarged  Uterus is not fixed, tender or irregular  No uterine mass detected  Uterus exam comments: 9 cm fibroid palpable just below umbilicus, mobile   Uterus is anteverted  Pelvic exam was performed with patient in the lithotomy position  HENT:      Head: Normocephalic  Cardiovascular:      Rate and Rhythm: Normal rate  Heart sounds: Normal heart sounds  Pulmonary:      Effort: Pulmonary effort is normal  No accessory muscle usage or respiratory distress  Abdominal:      General: There is no distension  Palpations: Abdomen is soft  There is no mass  Tenderness: There is no abdominal tenderness  There is no guarding or rebound  Musculoskeletal:         General: Normal range of motion  Cervical back: No rigidity  Lymphadenopathy:      Lower Body: No right inguinal adenopathy  No left inguinal adenopathy  Neurological:      General: No focal deficit present  Mental Status: She is alert  Mental status is at baseline  Skin:     General: Skin is warm and dry  Psychiatric:         Mood and Affect: Mood normal          Behavior: Behavior normal    Vitals and nursing note reviewed  Exam conducted with a chaperone present  Appropriate laboratory testing, imaging studies, and prior external records were reviewed:     Assessment/Plan:       Problem List Items Addressed This Visit        Genitourinary    Uterine fibroid     Recent US with right fundal/subserosal 8 9 x 9 1 x 9 4 cm and posterior intramural fibroid measuring 1 8 x 2 2 x 1 7 cm    Stable in size from 2015 US from previous practice  Discussed possibility of difficulty with visualization during surgery although I do not anticipate significant issues with this             Other    Sterilization consult - Primary     Patient counseled on risks benefits and alternatives to permanent sterilization  She was counseled on long-acting reversible contraception as well as permanent sterilization for her male partner  She declined these alternatives  She was counseled on risks associated with laparoscopic sterilization including risk from general anesthesia, infection, blood loss, injury to bowel bladder, ureter or risk of laparotomy, risk of pregnancy/failure rate of procedure of approximately 5-7/1000, risk of ectopic pregnancy, and risk of regret  She was counseled on laparoscopic bilateral salpingectomy to reduce her risk of ovarian cancer and accepts this     Advised that she must use condoms consistently prior to surgery   ERLIN not needed  OR consents signed  Tentatively scheduled 9/1 afternoon   Discussed need for PAT ahead of time

## 2022-08-30 ENCOUNTER — ANESTHESIA EVENT (OUTPATIENT)
Dept: PERIOP | Facility: HOSPITAL | Age: 42
End: 2022-08-30
Payer: COMMERCIAL

## 2022-09-01 ENCOUNTER — ANESTHESIA (OUTPATIENT)
Dept: PERIOP | Facility: HOSPITAL | Age: 42
End: 2022-09-01
Payer: COMMERCIAL

## 2022-09-01 ENCOUNTER — HOSPITAL ENCOUNTER (OUTPATIENT)
Facility: HOSPITAL | Age: 42
Setting detail: OUTPATIENT SURGERY
Discharge: HOME | End: 2022-09-01
Attending: OBSTETRICS & GYNECOLOGY | Admitting: OBSTETRICS & GYNECOLOGY
Payer: COMMERCIAL

## 2022-09-01 VITALS
TEMPERATURE: 98.5 F | WEIGHT: 150.13 LBS | HEIGHT: 65 IN | OXYGEN SATURATION: 100 % | DIASTOLIC BLOOD PRESSURE: 83 MMHG | SYSTOLIC BLOOD PRESSURE: 110 MMHG | RESPIRATION RATE: 18 BRPM | BODY MASS INDEX: 25.01 KG/M2 | HEART RATE: 72 BPM

## 2022-09-01 DIAGNOSIS — Z30.09 STERILIZATION CONSULT: ICD-10-CM

## 2022-09-01 DIAGNOSIS — Z90.79 STATUS POST BILATERAL SALPINGECTOMY: Primary | ICD-10-CM

## 2022-09-01 LAB
EXT PREGNANCY TEST URINE: NEGATIVE
EXT. CONTROL: NORMAL

## 2022-09-01 PROCEDURE — 58661 LAPAROSCOPY REMOVE ADNEXA: CPT | Performed by: OBSTETRICS & GYNECOLOGY

## 2022-09-01 PROCEDURE — 88302 TISSUE EXAM BY PATHOLOGIST: CPT | Performed by: PATHOLOGY

## 2022-09-01 PROCEDURE — 81025 URINE PREGNANCY TEST: CPT | Performed by: ANESTHESIOLOGY

## 2022-09-01 RX ORDER — GLYCOPYRROLATE 0.2 MG/ML
INJECTION INTRAMUSCULAR; INTRAVENOUS AS NEEDED
Status: DISCONTINUED | OUTPATIENT
Start: 2022-09-01 | End: 2022-09-01

## 2022-09-01 RX ORDER — KETOROLAC TROMETHAMINE 30 MG/ML
INJECTION, SOLUTION INTRAMUSCULAR; INTRAVENOUS AS NEEDED
Status: DISCONTINUED | OUTPATIENT
Start: 2022-09-01 | End: 2022-09-01

## 2022-09-01 RX ORDER — MIDAZOLAM HYDROCHLORIDE 2 MG/2ML
INJECTION, SOLUTION INTRAMUSCULAR; INTRAVENOUS AS NEEDED
Status: DISCONTINUED | OUTPATIENT
Start: 2022-09-01 | End: 2022-09-01

## 2022-09-01 RX ORDER — OXYCODONE HYDROCHLORIDE 5 MG/1
5 TABLET ORAL EVERY 4 HOURS PRN
Status: DISCONTINUED | OUTPATIENT
Start: 2022-09-01 | End: 2022-09-01 | Stop reason: HOSPADM

## 2022-09-01 RX ORDER — BUPIVACAINE HYDROCHLORIDE 5 MG/ML
INJECTION, SOLUTION PERINEURAL AS NEEDED
Status: DISCONTINUED | OUTPATIENT
Start: 2022-09-01 | End: 2022-09-01 | Stop reason: HOSPADM

## 2022-09-01 RX ORDER — ROCURONIUM BROMIDE 10 MG/ML
INJECTION, SOLUTION INTRAVENOUS AS NEEDED
Status: DISCONTINUED | OUTPATIENT
Start: 2022-09-01 | End: 2022-09-01

## 2022-09-01 RX ORDER — SODIUM CHLORIDE, SODIUM LACTATE, POTASSIUM CHLORIDE, CALCIUM CHLORIDE 600; 310; 30; 20 MG/100ML; MG/100ML; MG/100ML; MG/100ML
125 INJECTION, SOLUTION INTRAVENOUS CONTINUOUS
Status: DISCONTINUED | OUTPATIENT
Start: 2022-09-01 | End: 2022-09-01 | Stop reason: HOSPADM

## 2022-09-01 RX ORDER — PROPOFOL 10 MG/ML
INJECTION, EMULSION INTRAVENOUS AS NEEDED
Status: DISCONTINUED | OUTPATIENT
Start: 2022-09-01 | End: 2022-09-01

## 2022-09-01 RX ORDER — ONDANSETRON 2 MG/ML
4 INJECTION INTRAMUSCULAR; INTRAVENOUS EVERY 6 HOURS PRN
Status: DISCONTINUED | OUTPATIENT
Start: 2022-09-01 | End: 2022-09-01 | Stop reason: HOSPADM

## 2022-09-01 RX ORDER — ACETAMINOPHEN 325 MG/1
650 TABLET ORAL EVERY 6 HOURS PRN
Refills: 0
Start: 2022-09-01

## 2022-09-01 RX ORDER — ACETAMINOPHEN 325 MG/1
975 TABLET ORAL EVERY 6 HOURS PRN
Status: DISCONTINUED | OUTPATIENT
Start: 2022-09-01 | End: 2022-09-01 | Stop reason: HOSPADM

## 2022-09-01 RX ORDER — ONDANSETRON 2 MG/ML
4 INJECTION INTRAMUSCULAR; INTRAVENOUS ONCE AS NEEDED
Status: DISCONTINUED | OUTPATIENT
Start: 2022-09-01 | End: 2022-09-01 | Stop reason: HOSPADM

## 2022-09-01 RX ORDER — ONDANSETRON 2 MG/ML
INJECTION INTRAMUSCULAR; INTRAVENOUS AS NEEDED
Status: DISCONTINUED | OUTPATIENT
Start: 2022-09-01 | End: 2022-09-01

## 2022-09-01 RX ORDER — NEOSTIGMINE METHYLSULFATE 1 MG/ML
INJECTION INTRAVENOUS AS NEEDED
Status: DISCONTINUED | OUTPATIENT
Start: 2022-09-01 | End: 2022-09-01

## 2022-09-01 RX ORDER — MEPERIDINE HYDROCHLORIDE 25 MG/ML
12.5 INJECTION INTRAMUSCULAR; INTRAVENOUS; SUBCUTANEOUS
Status: DISCONTINUED | OUTPATIENT
Start: 2022-09-01 | End: 2022-09-01 | Stop reason: HOSPADM

## 2022-09-01 RX ORDER — DEXAMETHASONE SODIUM PHOSPHATE 10 MG/ML
INJECTION, SOLUTION INTRAMUSCULAR; INTRAVENOUS AS NEEDED
Status: DISCONTINUED | OUTPATIENT
Start: 2022-09-01 | End: 2022-09-01

## 2022-09-01 RX ORDER — HYDROMORPHONE HCL/PF 1 MG/ML
0.5 SYRINGE (ML) INJECTION
Status: DISCONTINUED | OUTPATIENT
Start: 2022-09-01 | End: 2022-09-01 | Stop reason: HOSPADM

## 2022-09-01 RX ORDER — IBUPROFEN 600 MG/1
600 TABLET ORAL EVERY 6 HOURS PRN
Qty: 30 TABLET | Refills: 0
Start: 2022-09-01

## 2022-09-01 RX ORDER — IBUPROFEN 600 MG/1
600 TABLET ORAL EVERY 6 HOURS PRN
Status: DISCONTINUED | OUTPATIENT
Start: 2022-09-01 | End: 2022-09-01 | Stop reason: HOSPADM

## 2022-09-01 RX ORDER — LIDOCAINE HYDROCHLORIDE 20 MG/ML
INJECTION, SOLUTION EPIDURAL; INFILTRATION; INTRACAUDAL; PERINEURAL AS NEEDED
Status: DISCONTINUED | OUTPATIENT
Start: 2022-09-01 | End: 2022-09-01

## 2022-09-01 RX ORDER — FENTANYL CITRATE 50 UG/ML
INJECTION, SOLUTION INTRAMUSCULAR; INTRAVENOUS AS NEEDED
Status: DISCONTINUED | OUTPATIENT
Start: 2022-09-01 | End: 2022-09-01

## 2022-09-01 RX ORDER — FENTANYL CITRATE/PF 50 MCG/ML
25 SYRINGE (ML) INJECTION
Status: DISCONTINUED | OUTPATIENT
Start: 2022-09-01 | End: 2022-09-01 | Stop reason: HOSPADM

## 2022-09-01 RX ORDER — OXYCODONE HYDROCHLORIDE 5 MG/1
5 TABLET ORAL EVERY 4 HOURS PRN
Qty: 10 TABLET | Refills: 0 | Status: SHIPPED | OUTPATIENT
Start: 2022-09-01 | End: 2022-09-16 | Stop reason: ALTCHOICE

## 2022-09-01 RX ADMIN — ONDANSETRON 4 MG: 2 INJECTION INTRAMUSCULAR; INTRAVENOUS at 14:53

## 2022-09-01 RX ADMIN — KETOROLAC TROMETHAMINE 30 MG: 30 INJECTION, SOLUTION INTRAMUSCULAR at 14:53

## 2022-09-01 RX ADMIN — DEXAMETHASONE SODIUM PHOSPHATE 10 MG: 10 INJECTION, SOLUTION INTRAMUSCULAR; INTRAVENOUS at 14:20

## 2022-09-01 RX ADMIN — LIDOCAINE HYDROCHLORIDE 100 MG: 20 INJECTION, SOLUTION EPIDURAL; INFILTRATION; INTRACAUDAL at 14:20

## 2022-09-01 RX ADMIN — MIDAZOLAM 2 MG: 1 INJECTION INTRAMUSCULAR; INTRAVENOUS at 14:15

## 2022-09-01 RX ADMIN — GLYCOPYRROLATE 0.4 MG: 0.2 INJECTION, SOLUTION INTRAMUSCULAR; INTRAVENOUS at 14:54

## 2022-09-01 RX ADMIN — PROPOFOL 200 MG: 10 INJECTION, EMULSION INTRAVENOUS at 14:20

## 2022-09-01 RX ADMIN — ROCURONIUM BROMIDE 30 MG: 50 INJECTION, SOLUTION INTRAVENOUS at 14:20

## 2022-09-01 RX ADMIN — SODIUM CHLORIDE, SODIUM LACTATE, POTASSIUM CHLORIDE, AND CALCIUM CHLORIDE 125 ML/HR: .6; .31; .03; .02 INJECTION, SOLUTION INTRAVENOUS at 15:43

## 2022-09-01 RX ADMIN — SODIUM CHLORIDE, SODIUM LACTATE, POTASSIUM CHLORIDE, AND CALCIUM CHLORIDE 125 ML/HR: .6; .31; .03; .02 INJECTION, SOLUTION INTRAVENOUS at 13:41

## 2022-09-01 RX ADMIN — NEOSTIGMINE METHYLSULFATE 3 MG: 1 INJECTION INTRAVENOUS at 14:54

## 2022-09-01 RX ADMIN — FENTANYL CITRATE 100 MCG: 50 INJECTION INTRAMUSCULAR; INTRAVENOUS at 14:20

## 2022-09-01 NOTE — OP NOTE
OPERATIVE REPORT  PATIENT NAME: Chun Bran    :  1980  MRN: [id number]  Pt Location: [facility] OR ROOM 04    SURGERY DATE: 2022    Surgeon(s) and Role:     * Sandy Brannon MD - Primary     * Estefani Shrestha MD - Assisting    Preop Diagnosis:  Sterilization consult [Z30 09]    Post-Op Diagnosis Codes:     * Sterilization consult [T91 28]    Procedure(s) (LRB):  SALPINGECTOMY, LAPAROSCOPIC (Bilateral)    Specimen(s):  ID Type Source Tests Collected by Time Destination   1 : Left Fallopian Tube Tissue Fallopian Tube, Left TISSUE EXAM Sandy Brannon MD 2022 [id number]    2 : Right Fallopian Tube Tissue Fallopian Tube, Right TISSUE EXAM Sandy Brannon MD 2022        Estimated Blood Loss:   Minimal    Complications:   None    Brief History    All risks, benefits, and alternatives to the procedure were discussed with the patient and she had the opportunity to ask questions  The risk of regret of procedure was also addressed with the patient and options for LARC was discussed  Patient expressed desire to continue with tubal sterilization  Informed consent was obtained  Description of Procedure    Patient was taken to the operating room where correct patient and correct procedure were confirmed  General endotracheal anesthesia (GET) was administered and the patient was positioned on the OR table in the dorsal lithotomy position  All pressure points were padded and a puma hugger was placed to maintain control of core body temperature  The patient was prepped and draped in the usual sterile fashion with chloroprep on the abdomen and chlorhexidine prep on the vagina and perineum  A time out was performed  Operative Findings:  Normal appearing external female genitalia  Normal appearing vaginal mucosa  Grossly normal appearing cervix  Mobile, enlarged uterus with palpable fibroid, -2 cm below the umbilicus  No palpable adnexal masses or fullness    Normal abdominal survey without evidence of injury or gross pathology  Adhesions present between the omentum and anterior abdominal wall and between the fibroid and anterior abdominal wall  Grossly normal pelvic survey with the exception of a large, subserosal fibroid approximately 9 cm, in the anterior aspect of the lower uterine segment  Grossly normal appearing bilateral fallopian tubes and ovaries  Operative Technique    A Swain catheter was introduced into the bladder  A speculum was inserted into the vagina and used to visualize the anterior lip of the cervix, which was then grasped with a long Allis clamp  The cervix was dilated and the uterus sounded to 9 cm  A uterine manipulator was inserted into the cervix and intrauterine balloon inflated  The speculum was removed from the vagina  Sterile gloves were then exchanged and attention was turned to the abdomen  Next, the deepest part of the umbilicus was grasped and everted with 2 Allis clamps  The edges of the umbilicus were elevated away from the abdominal organs and vessels  A Veress needle was gently inserted into the umbilicus at a 45 degree angle  Once entry into the abdominal cavity was confirmed, the abdomen was insufflated with CO2 gas to 15 mmHg  Next, a 10 mm diagnostic laparoscope was inserted via direct entry into the umbilicus  The entire abdomen and pelvis were inspected and there was no evidence of injury to bowel, bladder, vasculature, or other structures  Attention was then turned to the pelvis  Patient was placed in Trendelenburg and the uterus was elevated to visualize the fallopian tubes  Findings noted above  Two additional port sites were selected in the left and right lower abdomen approximately 2cm superior and medial to the iliac crests  A 5mm incision was made for introduction of a 5mm trocar under direct visualization at each site  An atraumatic grasper was inserted through this port and used to visualize the fimbriated ends of the tubes   The Enseal was used to ligate the omental adhesion located on the anterior abdominal wall  The left fallopian tube was grasped at its fimbriated end with a blunt grasper and elevated to visualize the mesosalpinx  TheEnseal device was used to ligate along the mesosalpinx, working proximally and taking care to avoid ovarian vasculature  Approximately 2cm from the cornua, the Enseal device was used to amputate fallopian tube  This was then withdrawn from the abdominal cavity and sent for pathology  Attention was then turned to the contralateral tube, which was amputated in similar fashion  Good hemostasis was confirmed following salpingectomy  Following salpingectomy, pneumoperitoneum was allowed to escape  Adequate hemostasis was visualized  The inferior trocars were removed under direct visualization  The laparoscope was withdrawn from the abdomen, followed by its trocar sleeve at the umbilicus  Skin incisions were closed with 4-0 monocryl  Attention was turned to the vagina  Speculum was reinserted and the uterine manipulator was withdrawn  The long Allis clamp was removed from the anterior lip of the cervix  Swain catheter and speculum were removed from the vagina  At the conclusion of the procedure, all needle, sponge, and instrument counts were noted to be correct x2  Patient tolerated the procedure well and was transferred to PACU in stable condition prior to discharge with follow up in 1-2 weeks  Dr Michelle Louie was present and participated in the entire case      Patient Disposition:  PACU       SIGNATURE: Breezy Miles MD  DATE: September 1, 2022  TIME: 3:10 PM

## 2022-09-01 NOTE — DISCHARGE INSTRUCTIONS
Salpingectomy   WHAT YOU NEED TO KNOW:   A salpingectomy is surgery to remove one or both of your fallopian tubes  The fallopian tubes carry eggs from the ovaries to the uterus  They are part of a woman's reproductive system  A salpingectomy may be done to treat an ectopic pregnancy, cancer, endometriosis, or an infection  It may also be done to prevent pregnancy or some types of cancer  DISCHARGE INSTRUCTIONS:   Call your local emergency number (911 in the [address]) for any of the following: You feel lightheaded, short of breath, and have chest pain  You cough up blood  You have trouble breathing  Seek care immediately if:   Your arm or leg feels warm, tender, and painful  It may look swollen and red  Blood soaks through your bandage  Your stitches come apart  You soak through 1 sanitary pad in 1 hour  You have trouble urinating or cannot urinate at all  Call your doctor or surgeon if:   You have a fever or chills  Your wound is red, swollen, or draining pus  You have pus or a foul-smelling odor coming from your vagina  Your pain does not get better after you take your medicine  You have nausea or are vomiting  Your skin is itchy, swollen, or you have a rash  You have questions or concerns about your condition or care  Medicines: You may need any of the following:  NSAIDs , such as ibuprofen, help decrease swelling, pain, and fever  NSAIDs can cause stomach bleeding or kidney problems in certain people  If you take blood thinner medicine, always ask your healthcare provider if NSAIDs are safe for you  Always read the medicine label and follow directions  Prescription pain medicine  may be given  Ask your healthcare provider how to take this medicine safely  Some prescription pain medicines contain acetaminophen  Do not take other medicines that contain acetaminophen without talking to your healthcare provider  Too much acetaminophen may cause liver damage  Prescription pain medicine may cause constipation  Ask your healthcare provider how to prevent or treat constipation  Take your medicine as directed  Contact your healthcare provider if you think your medicine is not helping or if you have side effects  Tell him or her if you are allergic to any medicine  Keep a list of the medicines, vitamins, and herbs you take  Include the amounts, and when and why you take them  Bring the list or the pill bottles to follow-up visits  Carry your medicine list with you in case of an emergency  Care for your wound as directed:  Ask your healthcare provider when your wound can get wet  Do not take a bath until your healthcare provider says it is okay  Take a shower only  Carefully wash around the wound with soap and water  Let the soap and water gently run over your incision  Do not  scrub your incision  Dry the area and put on new, clean bandages as directed  Change your bandages when they get wet or dirty  If you have strips of medical tape, let them fall off on their own  Activity:  Ask your healthcare provider when you can return to your normal activities  Do not douche, use tampons, or have sex until your healthcare provider says it is okay  These activities may cause infection  Do not exercise or lift anything heavy until your healthcare provider says it is okay  This may put too much stress on your incision  Follow up with your doctor or surgeon as directed:  Write down your questions so you remember to ask them during your visits  © Copyright [employer] 2022 Information is for End User's use only and may not be sold, redistributed or otherwise used for commercial purposes  All illustrations and images included in CareNotes® are the copyrighted property of [employer] M [employer] Inc  or [employer]  The above information is an  only  It is not intended as medical advice for individual conditions or treatments   Talk to your doctor, nurse or pharmacist before following any medical regimen to see if it is safe and effective for you

## 2022-09-01 NOTE — ANESTHESIA POSTPROCEDURE EVALUATION
Post-Op Assessment Note    CV Status:  Stable  Pain Score: 1    Pain management: adequate     Mental Status:  Alert and awake   Hydration Status:  Euvolemic   PONV Controlled:  Controlled   Airway Patency:  Patent      Post Op Vitals Reviewed: Yes      Staff: Anesthesiologist         No complications documented      /61 (09/01/22 1541)    Temp 99 1 °F (37 3 °C) (warm blanket on face) (09/01/22 1541)    Pulse 60 (09/01/22 1541)   Resp 18 (09/01/22 1541)    SpO2 99 % (09/01/22 1541)

## 2022-09-01 NOTE — ANESTHESIA PREPROCEDURE EVALUATION
Procedure:  SALPINGECTOMY, LAPAROSCOPIC (Bilateral Uterus)    Relevant Problems   CARDIO   (+) Migraine without aura and without status migrainosus, not intractable      MUSCULOSKELETAL   (+) Acute midline low back pain without sciatica      NEURO/PSYCH   (+) Migraine without aura and without status migrainosus, not intractable      PULMONARY   (+) Severe obstructive sleep apnea        Physical Exam    Airway    Mallampati score: II  TM Distance: >3 FB  Neck ROM: full     Dental   No notable dental hx     Cardiovascular  Rhythm: regular, Rate: normal, Cardiovascular exam normal    Pulmonary  Pulmonary exam normal Breath sounds clear to auscultation,     Other Findings        Anesthesia Plan  ASA Score- 2     Anesthesia Type- general with ASA Monitors  Additional Monitors:   Airway Plan: ETT  Plan Factors-    Chart reviewed  Existing labs reviewed  Patient summary reviewed  Patient is not a current smoker  Patient not instructed to abstain from smoking on day of procedure  Patient did not smoke on day of surgery  Induction- intravenous  Postoperative Plan-     Informed Consent- Anesthetic plan and risks discussed with patient Guadalupe Benz

## 2022-09-01 NOTE — INTERVAL H&P NOTE
H&P reviewed  After examining the patient I find no changes in the patients condition since the H&P had been written      Vitals:    09/01/22 1316   BP: 114/71   Pulse: 73   Resp: 16   Temp: 98 5 °F (36 9 °C)   SpO2: 100%

## 2022-09-08 ENCOUNTER — OFFICE VISIT (OUTPATIENT)
Dept: OBGYN CLINIC | Facility: CLINIC | Age: 42
End: 2022-09-08
Payer: COMMERCIAL

## 2022-09-08 ENCOUNTER — TELEPHONE (OUTPATIENT)
Dept: OBGYN CLINIC | Facility: CLINIC | Age: 42
End: 2022-09-08

## 2022-09-08 VITALS
HEIGHT: 65 IN | OXYGEN SATURATION: 99 % | BODY MASS INDEX: 25.69 KG/M2 | TEMPERATURE: 97.8 F | WEIGHT: 154.2 LBS | SYSTOLIC BLOOD PRESSURE: 100 MMHG | DIASTOLIC BLOOD PRESSURE: 68 MMHG | HEART RATE: 66 BPM

## 2022-09-08 DIAGNOSIS — T78.40XA ALLERGIC RASH PRESENT ON EXAMINATION: Primary | ICD-10-CM

## 2022-09-08 PROBLEM — L23.89 ALLERGIC CONTACT DERMATITIS DUE TO OTHER AGENTS: Status: ACTIVE | Noted: 2022-09-08

## 2022-09-08 PROCEDURE — 99213 OFFICE O/P EST LOW 20 MIN: CPT | Performed by: OBSTETRICS & GYNECOLOGY

## 2022-09-08 RX ORDER — PREDNISONE 20 MG/1
40 TABLET ORAL DAILY
Qty: 5 TABLET | Refills: 0 | Status: SHIPPED | OUTPATIENT
Start: 2022-09-08 | End: 2022-09-16 | Stop reason: ALTCHOICE

## 2022-09-08 NOTE — ASSESSMENT & PLAN NOTE
Discussed likely form of allergic contact dermatitis given onset of sx after surgery and in distribution to surgical prep given it is more concentrated than hibiclens  Will do short course of prednisone, also sent rx for stronger topical hydrocortisone   Will f/u in 1 week as previously scheduled

## 2022-09-08 NOTE — PROGRESS NOTES
Subjective   Patient ID: Chun Bran is a 43 y o  female  Patient is here for a problem visit  Chief Complaint   Patient presents with    Rash     Lower abdomen/groin area, spreading to sides, burning/itching; started , no other sx     LSC b/l salpingectomy 1 week ago  Within 3 days of surgery started having diffuse itching and rash all over abdomen, upper thighs, under breast  Denies any previous reactions like this including to presurgical hibiclens wash   Denies significant pain with incisions or drainage  No fevers/chills  She has tried OTC topical benadryl, 1% hydrocortisone and antifungal cream with minimal relief     Menstrual History:  OB History        3    Para   3    Term   3            AB        Living   3       SAB        IAB        Ectopic        Multiple        Live Births                    Patient's last menstrual period was 2022 (approximate)           Past Medical History:   Diagnosis Date    Fibroids     Sleep apnea     no current Cpap usage       Past Surgical History:   Procedure Laterality Date    ANKLE SURGERY       SECTION      x1    MOLE REMOVAL      chest    PILONIDAL CYST EXCISION      MA LAP,RMV  ADNEXAL STRUCTURE Bilateral 2022    Procedure: SALPINGECTOMY, LAPAROSCOPIC;  Surgeon: Sandy Brannon MD;  Location: [facility];  Service: Gynecology    WISDOM TOOTH EXTRACTION Bilateral        Social History     Tobacco Use    Smoking status: Never Smoker    Smokeless tobacco: Never Used   Vaping Use    Vaping Use: Never used   Substance Use Topics    Alcohol use: Yes     Comment: socially    Drug use: Never        Allergies   Allergen Reactions    Chlorhexidine Rash         Current Outpatient Medications:     acetaminophen (TYLENOL) 325 mg tablet, Take 2 tablets (650 mg total) by mouth every 6 (six) hours as needed for mild pain, Disp: , Rfl: 0    Calcium Carbonate (CALCIUM 600 PO), Take 1 tablet by mouth daily, Disp: , Rfl:     Cholecalciferol (Vitamin D3) 125 MCG (5000 UT) TABS, Take 5,000 Units by mouth daily, Disp: , Rfl:     ibuprofen (MOTRIN) 600 mg tablet, Take 1 tablet (600 mg total) by mouth every 6 (six) hours as needed for mild pain, Disp: 30 tablet, Rfl: 0    Magnesium 400 MG CAPS, Take 1 capsule by mouth daily, Disp: , Rfl:     multivitamin (THERAGRAN) TABS, Take 1 tablet by mouth daily, Disp: , Rfl:     predniSONE 20 mg tablet, Take 2 tablets (40 mg total) by mouth daily, Disp: 5 tablet, Rfl: 0    oxyCODONE (Roxicodone) 5 immediate release tablet, Take 1 tablet (5 mg total) by mouth every 4 (four) hours as needed for severe pain for up to 10 doses Max Daily Amount: 30 mg (Patient not taking: Reported on 9/8/2022), Disp: 10 tablet, Rfl: 0    Current Facility-Administered Medications:     hydrocortisone 2 5 % cream, , Topical, 4x Daily PRN, Sandy Brannon MD      Review of Systems   Constitutional: Negative for appetite change, chills and fever  Eyes: Negative for visual disturbance  Respiratory: Negative for cough, chest tightness and shortness of breath  Cardiovascular: Negative for chest pain  Gastrointestinal: Negative for abdominal distention, abdominal pain, constipation, diarrhea, nausea and vomiting  Endocrine: Negative for cold intolerance and heat intolerance  Genitourinary: Negative for difficulty urinating, dyspareunia, dysuria, frequency, genital sores, pelvic pain, urgency, vaginal bleeding, vaginal discharge and vaginal pain  Musculoskeletal: Negative for arthralgias  Skin: Positive for rash  Neurological: Negative for light-headedness and headaches  Hematological: Does not bruise/bleed easily  Psychiatric/Behavioral: Negative for behavioral problems  All other systems reviewed and are negative          /68 (BP Location: Right arm, Patient Position: Sitting, Cuff Size: Adult)   Pulse 66   Temp 97 8 °F (36 6 °C) (Tympanic)   Ht 5' 5" (1 651 m)   Wt 69 9 kg (154 lb 3 2 oz)   LMP 08/13/2022 (Approximate)   SpO2 99%   BMI 25 66 kg/m²       Physical Exam  Constitutional:       General: She is not in acute distress  Appearance: Normal appearance  HENT:      Head: Normocephalic and atraumatic  Cardiovascular:      Rate and Rhythm: Normal rate  Pulmonary:      Effort: Pulmonary effort is normal  No respiratory distress  Abdominal:      General: A surgical scar is present  There is no distension  Palpations: Abdomen is soft  Tenderness: There is no abdominal tenderness  There is no guarding or rebound  Comments: Diffuse maculopapular rash across abdomen and upper thighs in distribution of surgical prep   Incisions C/D/I without induration, drainage, erythema    Neurological:      General: No focal deficit present  Mental Status: She is alert  Psychiatric:         Mood and Affect: Mood normal          Behavior: Behavior normal    Vitals and nursing note reviewed  Appropriate laboratory testing, imaging studies, and prior external records were reviewed:     Assessment/Plan:       Problem List Items Addressed This Visit        Musculoskeletal and Integument    Allergic rash present on examination - Primary     Discussed likely form of allergic contact dermatitis given onset of sx after surgery and in distribution to surgical prep given it is more concentrated than hibiclens  Will do short course of prednisone, also sent rx for stronger topical hydrocortisone   Will f/u in 1 week as previously scheduled          Relevant Medications    hydrocortisone 2 5 % cream    predniSONE 20 mg tablet

## 2022-09-08 NOTE — TELEPHONE ENCOUNTER
Patient calling in regards to having a rash on her stomach that is spreading since surgery  Patient states that she tried a bendryl ointment and it did not help  Patient is currently using  Her dandruff shampoo on it, which is called nizoral and the ingredient is ketoconazole, seems to have a little relief with that  Patient wanted to know if she should be seen sooner or if something can be prescribed to help the rash  Please advise

## 2022-09-09 PROCEDURE — 88302 TISSUE EXAM BY PATHOLOGIST: CPT | Performed by: PATHOLOGY

## 2022-09-16 ENCOUNTER — OFFICE VISIT (OUTPATIENT)
Dept: OBGYN CLINIC | Facility: CLINIC | Age: 42
End: 2022-09-16
Payer: COMMERCIAL

## 2022-09-16 VITALS
WEIGHT: 154.4 LBS | HEART RATE: 67 BPM | BODY MASS INDEX: 25.72 KG/M2 | HEIGHT: 65 IN | DIASTOLIC BLOOD PRESSURE: 78 MMHG | SYSTOLIC BLOOD PRESSURE: 114 MMHG | OXYGEN SATURATION: 99 % | TEMPERATURE: 98 F

## 2022-09-16 DIAGNOSIS — Z90.79 STATUS POST BILATERAL SALPINGECTOMY: Primary | ICD-10-CM

## 2022-09-16 DIAGNOSIS — N89.8 VAGINAL ITCHING: ICD-10-CM

## 2022-09-16 LAB
BV WHIFF TEST VAG QL: NORMAL
CLUE CELLS SPEC QL WET PREP: NORMAL
PH SMN: NORMAL [PH]
SL AMB POCT WET MOUNT: NORMAL
T VAGINALIS VAG QL WET PREP: NORMAL
YEAST VAG QL WET PREP: NORMAL

## 2022-09-16 PROCEDURE — 87510 GARDNER VAG DNA DIR PROBE: CPT | Performed by: OBSTETRICS & GYNECOLOGY

## 2022-09-16 PROCEDURE — 99213 OFFICE O/P EST LOW 20 MIN: CPT | Performed by: OBSTETRICS & GYNECOLOGY

## 2022-09-16 PROCEDURE — 87480 CANDIDA DNA DIR PROBE: CPT | Performed by: OBSTETRICS & GYNECOLOGY

## 2022-09-16 PROCEDURE — 87660 TRICHOMONAS VAGIN DIR PROBE: CPT | Performed by: OBSTETRICS & GYNECOLOGY

## 2022-09-16 PROCEDURE — 87210 SMEAR WET MOUNT SALINE/INK: CPT | Performed by: OBSTETRICS & GYNECOLOGY

## 2022-09-16 RX ORDER — CLOBETASOL PROPIONATE 0.5 MG/G
OINTMENT TOPICAL 2 TIMES DAILY
Qty: 30 G | Refills: 0 | Status: SHIPPED | OUTPATIENT
Start: 2022-09-16

## 2022-09-16 NOTE — PROGRESS NOTES
Subjective   Patient ID: Ludivina Montelongo is a 43 y o  female  Patient is here for a problem visit  Chief Complaint   Patient presents with    Post-op     Bilateral salpingectomy ; Pt having some vaginal irritation, she thinks it is a yeast infection, no other concerns     Abdominal rash improved after prednisone course   One area with vaginal itching, some discharge, no burning sensation, possible odor   Denies issues with incisions, no pain, no vaginal bleeding     Menstrual History:  OB History        3    Para   3    Term   3            AB        Living   3       SAB        IAB        Ectopic        Multiple        Live Births                    Patient's last menstrual period was 2022 (approximate)           Past Medical History:   Diagnosis Date    Fibroid     Fibroids     Sleep apnea     no current Cpap usage       Past Surgical History:   Procedure Laterality Date    ANKLE SURGERY       SECTION      x1    MOLE REMOVAL      chest    PILONIDAL CYST EXCISION      [place] LAP,RMV  ADNEXAL STRUCTURE Bilateral 2022    Procedure: SALPINGECTOMY, LAPAROSCOPIC;  Surgeon: Carlin Saba MD;  Location: [facility];  Service: Gynecology    WISDOM TOOTH EXTRACTION Bilateral        Social History     Tobacco Use    Smoking status: Never Smoker    Smokeless tobacco: Never Used   Vaping Use    Vaping Use: Never used   Substance Use Topics    Alcohol use: Not Currently     Comment: Special occasions only    Drug use: Never        Allergies   Allergen Reactions    Chlorhexidine Rash         Current Outpatient Medications:     acetaminophen (TYLENOL) 325 mg tablet, Take 2 tablets (650 mg total) by mouth every 6 (six) hours as needed for mild pain, Disp: , Rfl: 0    Calcium Carbonate (CALCIUM 600 PO), Take 1 tablet by mouth daily, Disp: , Rfl:     Cholecalciferol (Vitamin D3) 125 MCG (5000 UT) TABS, Take 5,000 Units by mouth daily, Disp: , Rfl:     clobetasol (TEMOVATE) 0 05 % ointment, Apply topically 2 (two) times a day, Disp: 30 g, Rfl: 0    ibuprofen (MOTRIN) 600 mg tablet, Take 1 tablet (600 mg total) by mouth every 6 (six) hours as needed for mild pain, Disp: 30 tablet, Rfl: 0    Magnesium 400 MG CAPS, Take 1 capsule by mouth daily, Disp: , Rfl:     multivitamin (THERAGRAN) TABS, Take 1 tablet by mouth daily, Disp: , Rfl:     Current Facility-Administered Medications:     hydrocortisone 2 5 % cream, , Topical, 4x Daily PRN, Sandy Brannon MD      Review of Systems   Constitutional: Negative for appetite change, chills and fever  Eyes: Negative for visual disturbance  Respiratory: Negative for cough, chest tightness and shortness of breath  Cardiovascular: Negative for chest pain  Gastrointestinal: Negative for abdominal distention, abdominal pain, constipation, diarrhea, nausea and vomiting  Endocrine: Negative for cold intolerance and heat intolerance  Genitourinary: Positive for vaginal discharge  Negative for difficulty urinating, dyspareunia, dysuria, frequency, genital sores, pelvic pain, urgency, vaginal bleeding and vaginal pain  Musculoskeletal: Negative for arthralgias  Neurological: Negative for light-headedness and headaches  Hematological: Does not bruise/bleed easily  Psychiatric/Behavioral: Negative for behavioral problems  All other systems reviewed and are negative  /78 (BP Location: Right arm, Patient Position: Sitting, Cuff Size: Adult)   Pulse 67   Temp 98 °F (36 7 °C) (Tympanic)   Ht 5' 5" (1 651 m)   Wt 70 kg (154 lb 6 4 oz)   LMP 09/03/2022 (Approximate)   SpO2 99%   BMI 25 69 kg/m²       Physical Exam  Constitutional:       General: She is not in acute distress  Appearance: Normal appearance  She is not ill-appearing  Genitourinary:      Urethral meatus normal       Right Labia: No rash, tenderness, lesions or skin changes  Left Labia: No tenderness, lesions, skin changes or rash  No labial fusion noted  Vulva exam comments: Points to upper left labia majora with itching - no lesions appreciated   No inguinal adenopathy present in the right or left side  No vaginal discharge, erythema, tenderness, bleeding or ulceration  Vaginal exam comments: Minimal physiologic appearing discharge   No cervical motion tenderness, discharge, friability, lesion, polyp or eversion  Pelvic exam was performed with patient in the lithotomy position  HENT:      Head: Normocephalic  Cardiovascular:      Rate and Rhythm: Normal rate  Heart sounds: Normal heart sounds  Pulmonary:      Effort: Pulmonary effort is normal  No accessory muscle usage or respiratory distress  Abdominal:      General: A surgical scar is present  There is no distension  Palpations: Abdomen is soft  There is no mass  Tenderness: There is no abdominal tenderness  There is no guarding or rebound  Comments: Incisions C/D/I  No rash    Musculoskeletal:         General: Normal range of motion  Cervical back: No rigidity  Lymphadenopathy:      Lower Body: No right inguinal adenopathy  No left inguinal adenopathy  Neurological:      General: No focal deficit present  Mental Status: She is alert  Mental status is at baseline  Skin:     General: Skin is warm and dry  Psychiatric:         Mood and Affect: Mood normal          Behavior: Behavior normal    Vitals and nursing note reviewed  Exam conducted with a chaperone present             Results for orders placed or performed in visit on 09/16/22   POCT wet mount   Result Value Ref Range    WET MOUNT -     Yeast, Wet Prep neg     pH -     Whiff Test neg     Clue Cells neg     Trich, Wet Prep neg        Appropriate laboratory testing, imaging studies, and prior external records were reviewed:     Assessment/Plan:       Problem List Items Addressed This Visit        Musculoskeletal and Integument    Vaginal itching     Wet mount unremarkable, rx sent for clobetasol for irritation, f/u affirm          Relevant Medications    clobetasol (TEMOVATE) 0 05 % ointment    Other Relevant Orders    POCT wet mount (Completed)       Other    Status post bilateral salpingectomy - Primary     Healing well postoperatively, will f/u for annual in June

## 2022-09-19 LAB
CANDIDA RRNA VAG QL PROBE: NEGATIVE
G VAGINALIS RRNA GENITAL QL PROBE: NEGATIVE
T VAGINALIS RRNA GENITAL QL PROBE: NEGATIVE

## 2022-10-12 PROBLEM — Z00.00 HEALTHCARE MAINTENANCE: Status: RESOLVED | Noted: 2022-02-09 | Resolved: 2022-10-12

## 2023-02-24 ENCOUNTER — HOSPITAL ENCOUNTER (OUTPATIENT)
Dept: RADIOLOGY | Facility: IMAGING CENTER | Age: 43
End: 2023-02-24

## 2023-02-24 VITALS — BODY MASS INDEX: 26.66 KG/M2 | WEIGHT: 160 LBS | HEIGHT: 65 IN

## 2023-02-24 DIAGNOSIS — Z12.31 ENCOUNTER FOR SCREENING MAMMOGRAM FOR BREAST CANCER: ICD-10-CM

## 2024-01-22 ENCOUNTER — OFFICE VISIT (OUTPATIENT)
Dept: FAMILY MEDICINE CLINIC | Facility: CLINIC | Age: 44
End: 2024-01-22
Payer: COMMERCIAL

## 2024-01-22 VITALS
BODY MASS INDEX: 27.96 KG/M2 | HEART RATE: 67 BPM | DIASTOLIC BLOOD PRESSURE: 80 MMHG | HEIGHT: 65 IN | WEIGHT: 167.8 LBS | RESPIRATION RATE: 16 BRPM | OXYGEN SATURATION: 100 % | TEMPERATURE: 98.7 F | SYSTOLIC BLOOD PRESSURE: 124 MMHG

## 2024-01-22 DIAGNOSIS — Z00.00 HEALTHCARE MAINTENANCE: ICD-10-CM

## 2024-01-22 DIAGNOSIS — R42 DIZZINESS: ICD-10-CM

## 2024-01-22 DIAGNOSIS — E55.9 VITAMIN D INSUFFICIENCY: ICD-10-CM

## 2024-01-22 DIAGNOSIS — Z12.31 SCREENING MAMMOGRAM, ENCOUNTER FOR: Primary | ICD-10-CM

## 2024-01-22 PROBLEM — M26.623 BILATERAL TEMPOROMANDIBULAR JOINT PAIN: Status: RESOLVED | Noted: 2018-06-08 | Resolved: 2024-01-22

## 2024-01-22 PROBLEM — G43.009 MIGRAINE WITHOUT AURA AND WITHOUT STATUS MIGRAINOSUS, NOT INTRACTABLE: Status: RESOLVED | Noted: 2018-06-08 | Resolved: 2024-01-22

## 2024-01-22 PROBLEM — M54.50 ACUTE MIDLINE LOW BACK PAIN WITHOUT SCIATICA: Status: RESOLVED | Noted: 2020-11-16 | Resolved: 2024-01-22

## 2024-01-22 PROBLEM — N89.8 VAGINAL ITCHING: Status: RESOLVED | Noted: 2022-09-16 | Resolved: 2024-01-22

## 2024-01-22 PROCEDURE — 99396 PREV VISIT EST AGE 40-64: CPT | Performed by: FAMILY MEDICINE

## 2024-01-22 PROCEDURE — 99214 OFFICE O/P EST MOD 30 MIN: CPT | Performed by: FAMILY MEDICINE

## 2024-01-22 NOTE — PROGRESS NOTES
Name: Angelika Graff      : 1980      MRN: [id number]  Encounter Provider: Jaime Alfaro MD  Encounter Date: 2024   Encounter department:  [facility]    Assessment & Plan     1. Screening mammogram, encounter for  -     Mammo screening bilateral w 3d & cad; Future; Expected date: 2024    2. Dizziness  Assessment & Plan:  Improving.  I am going to check a blood work.  It was discussed with patient if symptoms recur to call or come back.      3. Healthcare maintenance  Assessment & Plan:  It was discussed about immunizations, diet, exercise and safety measures.    Orders:  -     CBC and differential; Future  -     Comprehensive metabolic panel; Future  -     Lipid Panel with Direct LDL reflex; Future  -     TSH, 3rd generation with Free T4 reflex; Future    4. Vitamin D insufficiency  Assessment & Plan:  Continue vitamin D.  Will continue to monitor.    Orders:  -     Vitamin D 25 hydroxy; Future           Subjective     She is here today with complaint of having problems feeling dizzy and for wellness exam.  She stated her dizziness only when she drinks coffee and since she cut down on caffeine she has been feeling better.  She denies any other complaint.    Dizziness  Pertinent negatives include no abdominal pain, chest pain, chills, coughing, fever, headaches or rash.     Review of Systems   Constitutional:  Negative for chills and fever.   HENT:  Negative for trouble swallowing.    Eyes:  Negative for visual disturbance.   Respiratory:  Negative for cough and shortness of breath.    Cardiovascular:  Negative for chest pain, palpitations and leg swelling.   Gastrointestinal:  Negative for abdominal pain, constipation and diarrhea.   Endocrine: Negative for cold intolerance and heat intolerance.   Genitourinary:  Negative for difficulty urinating and dysuria.   Musculoskeletal:  Negative for gait problem.   Skin:  Negative for rash.   Neurological:  Positive for  dizziness. Negative for tremors, seizures and headaches.   Hematological:  Negative for adenopathy.   Psychiatric/Behavioral:  Negative for behavioral problems.        Past Medical History:   Diagnosis Date   • Fibroid    • Fibroids    • Sleep apnea     no current Cpap usage     Past Surgical History:   Procedure Laterality Date   • ANKLE SURGERY     •  SECTION      x1   • MOLE REMOVAL      chest   • PILONIDAL CYST EXCISION     • [place] LAPAROSCOPY W/RMVL ADNEXAL STRUCTURES Bilateral 2022    Procedure: SALPINGECTOMY, LAPAROSCOPIC;  Surgeon: Yenni Zaragoza MD;  Location: [facility] OR;  Service: Gynecology   • WISDOM TOOTH EXTRACTION Bilateral      Family History   Problem Relation Age of Onset   • Thyroid disease Mother    • Asthma Sister    • No Known Problems Daughter    • No Known Problems Daughter    • Diabetes Maternal Grandmother    • No Known Problems Maternal Grandfather    • Diabetes Paternal Grandmother    • Heart disease Paternal Grandfather    • Diabetes Paternal Grandfather    • Stroke Paternal Grandfather    • No Known Problems Paternal Aunt    • No Known Problems Paternal Aunt    • No Known Problems Paternal Aunt      Social History     Socioeconomic History   • Marital status: Single     Spouse name: None   • Number of children: None   • Years of education: None   • Highest education level: None   Occupational History   • None   Tobacco Use   • Smoking status: Never   • Smokeless tobacco: Never   Vaping Use   • Vaping status: Never Used   Substance and Sexual Activity   • Alcohol use: Not Currently     Comment: Special occasions only   • Drug use: Never   • Sexual activity: Yes     Partners: Male     Birth control/protection: Condom Male   Other Topics Concern   • None   Social History Narrative   • None     Social Determinants of Health     Financial Resource Strain: Not on file   Food Insecurity: Not on file   Transportation Needs: Not on file   Physical Activity: Not on file  "  Stress: Not on file   Social Connections: Not on file   Intimate Partner Violence: Not on file   Housing Stability: Not on file     Current Outpatient Medications on File Prior to Visit   Medication Sig   • Calcium Carbonate (CALCIUM 600 PO) Take 1 tablet by mouth daily   • Cholecalciferol (Vitamin D3) 125 MCG (5000 UT) TABS Take 5,000 Units by mouth daily   • Magnesium 400 MG CAPS Take 1 capsule by mouth daily   • multivitamin (THERAGRAN) TABS Take 1 tablet by mouth daily   • [DISCONTINUED] acetaminophen (TYLENOL) 325 mg tablet Take 2 tablets (650 mg total) by mouth every 6 (six) hours as needed for mild pain   • [DISCONTINUED] clobetasol (TEMOVATE) 0.05 % ointment Apply topically 2 (two) times a day   • [DISCONTINUED] ibuprofen (MOTRIN) 600 mg tablet Take 1 tablet (600 mg total) by mouth every 6 (six) hours as needed for mild pain     Allergies   Allergen Reactions   • Chlorhexidine Rash     Immunization History   Administered Date(s) Administered   • COVID-19 MODERNA VACC 0.5 ML IM 03/17/2021, 04/14/2021, 12/08/2021   • INFLUENZA 10/09/2014, 10/11/2020, 10/09/2021, 10/12/2022   • Tdap 07/06/2015       Objective     /80 (BP Location: Left arm, Patient Position: Sitting, Cuff Size: Standard)   Pulse 67   Temp 98.7 °F (37.1 °C) (Tympanic)   Resp 16   Ht 5' 5\" (1.651 m)   Wt 76.1 kg (167 lb 12.8 oz)   SpO2 100%   BMI 27.92 kg/m²     Physical Exam  Vitals and nursing note reviewed.   Constitutional:       Appearance: She is well-developed.   HENT:      Head: Normocephalic and atraumatic.   Eyes:      Pupils: Pupils are equal, round, and reactive to light.   Cardiovascular:      Rate and Rhythm: Normal rate and regular rhythm.      Heart sounds: Normal heart sounds.   Pulmonary:      Effort: Pulmonary effort is normal.      Breath sounds: Normal breath sounds.   Abdominal:      General: Bowel sounds are normal.      Palpations: Abdomen is soft.   Musculoskeletal:      Cervical back: Normal range of " motion and neck supple.   Lymphadenopathy:      Cervical: No cervical adenopathy.   Skin:     General: Skin is warm.   Neurological:      Mental Status: She is alert and oriented to person, place, and time.       Jaime Alfaro MD

## 2024-01-22 NOTE — ASSESSMENT & PLAN NOTE
Improving.  I am going to check a blood work.  It was discussed with patient if symptoms recur to call or come back.

## 2024-01-23 ENCOUNTER — APPOINTMENT (OUTPATIENT)
Dept: LAB | Age: 44
End: 2024-01-23
Payer: COMMERCIAL

## 2024-01-23 DIAGNOSIS — Z00.00 HEALTHCARE MAINTENANCE: ICD-10-CM

## 2024-01-23 DIAGNOSIS — E55.9 VITAMIN D INSUFFICIENCY: ICD-10-CM

## 2024-01-23 LAB
25(OH)D3 SERPL-MCNC: 40.1 NG/ML (ref 30–100)
ALBUMIN SERPL BCP-MCNC: 4.1 G/DL (ref 3.5–5)
ALP SERPL-CCNC: 75 U/L (ref 34–104)
ALT SERPL W P-5'-P-CCNC: 14 U/L (ref 7–52)
ANION GAP SERPL CALCULATED.3IONS-SCNC: 10 MMOL/L
AST SERPL W P-5'-P-CCNC: 17 U/L (ref 13–39)
BASOPHILS # BLD AUTO: 0.03 THOUSANDS/ÂΜL (ref 0–0.1)
BASOPHILS NFR BLD AUTO: 1 % (ref 0–1)
BILIRUB SERPL-MCNC: 0.45 MG/DL (ref 0.2–1)
BUN SERPL-MCNC: 16 MG/DL (ref 5–25)
CALCIUM SERPL-MCNC: 9 MG/DL (ref 8.4–10.2)
CHLORIDE SERPL-SCNC: 105 MMOL/L (ref 96–108)
CHOLEST SERPL-MCNC: 185 MG/DL
CO2 SERPL-SCNC: 25 MMOL/L (ref 21–32)
CREAT SERPL-MCNC: 0.74 MG/DL (ref 0.6–1.3)
EOSINOPHIL # BLD AUTO: 0.22 THOUSAND/ÂΜL (ref 0–0.61)
EOSINOPHIL NFR BLD AUTO: 5 % (ref 0–6)
ERYTHROCYTE [DISTWIDTH] IN BLOOD BY AUTOMATED COUNT: 13.6 % (ref 11.6–15.1)
GFR SERPL CREATININE-BSD FRML MDRD: 99 ML/MIN/1.73SQ M
GLUCOSE P FAST SERPL-MCNC: 91 MG/DL (ref 65–99)
HCT VFR BLD AUTO: 41.5 % (ref 34.8–46.1)
HDLC SERPL-MCNC: 68 MG/DL
HGB BLD-MCNC: 13.5 G/DL (ref 11.5–15.4)
IMM GRANULOCYTES # BLD AUTO: 0.02 THOUSAND/UL (ref 0–0.2)
IMM GRANULOCYTES NFR BLD AUTO: 1 % (ref 0–2)
LDLC SERPL CALC-MCNC: 106 MG/DL (ref 0–100)
LYMPHOCYTES # BLD AUTO: 1.28 THOUSANDS/ÂΜL (ref 0.6–4.47)
LYMPHOCYTES NFR BLD AUTO: 29 % (ref 14–44)
MCH RBC QN AUTO: 29.7 PG (ref 26.8–34.3)
MCHC RBC AUTO-ENTMCNC: 32.5 G/DL (ref 31.4–37.4)
MCV RBC AUTO: 91 FL (ref 82–98)
MONOCYTES # BLD AUTO: 0.35 THOUSAND/ÂΜL (ref 0.17–1.22)
MONOCYTES NFR BLD AUTO: 8 % (ref 4–12)
NEUTROPHILS # BLD AUTO: 2.48 THOUSANDS/ÂΜL (ref 1.85–7.62)
NEUTS SEG NFR BLD AUTO: 56 % (ref 43–75)
NRBC BLD AUTO-RTO: 0 /100 WBCS
PLATELET # BLD AUTO: 242 THOUSANDS/UL (ref 149–390)
PMV BLD AUTO: 11 FL (ref 8.9–12.7)
POTASSIUM SERPL-SCNC: 4.3 MMOL/L (ref 3.5–5.3)
PROT SERPL-MCNC: 6.8 G/DL (ref 6.4–8.4)
RBC # BLD AUTO: 4.55 MILLION/UL (ref 3.81–5.12)
SODIUM SERPL-SCNC: 140 MMOL/L (ref 135–147)
TRIGL SERPL-MCNC: 54 MG/DL
TSH SERPL DL<=0.05 MIU/L-ACNC: 3.15 UIU/ML (ref 0.45–4.5)
WBC # BLD AUTO: 4.38 THOUSAND/UL (ref 4.31–10.16)

## 2024-01-23 PROCEDURE — 80061 LIPID PANEL: CPT

## 2024-01-23 PROCEDURE — 84443 ASSAY THYROID STIM HORMONE: CPT

## 2024-01-23 PROCEDURE — 80053 COMPREHEN METABOLIC PANEL: CPT

## 2024-01-23 PROCEDURE — 82306 VITAMIN D 25 HYDROXY: CPT

## 2024-01-23 PROCEDURE — 36415 COLL VENOUS BLD VENIPUNCTURE: CPT

## 2024-01-23 PROCEDURE — 85025 COMPLETE CBC W/AUTO DIFF WBC: CPT

## 2024-02-21 PROBLEM — Z00.00 HEALTHCARE MAINTENANCE: Status: RESOLVED | Noted: 2022-02-09 | Resolved: 2024-02-21

## 2024-02-26 ENCOUNTER — TELEPHONE (OUTPATIENT)
Dept: FAMILY MEDICINE CLINIC | Facility: CLINIC | Age: 44
End: 2024-02-26

## 2024-02-26 ENCOUNTER — HOSPITAL ENCOUNTER (OUTPATIENT)
Dept: RADIOLOGY | Facility: IMAGING CENTER | Age: 44
Discharge: HOME | End: 2024-02-26
Payer: COMMERCIAL

## 2024-02-26 VITALS — BODY MASS INDEX: 26.99 KG/M2 | HEIGHT: 65 IN | WEIGHT: 162 LBS

## 2024-02-26 DIAGNOSIS — Z12.31 SCREENING MAMMOGRAM, ENCOUNTER FOR: ICD-10-CM

## 2024-02-26 PROCEDURE — 77063 BREAST TOMOSYNTHESIS BI: CPT

## 2024-02-26 PROCEDURE — 77067 SCR MAMMO BI INCL CAD: CPT

## 2024-03-05 ENCOUNTER — TELEPHONE (OUTPATIENT)
Dept: FAMILY MEDICINE CLINIC | Facility: CLINIC | Age: 44
End: 2024-03-05

## 2024-03-05 NOTE — TELEPHONE ENCOUNTER
----- Message from Jaime Alfaro MD sent at 3/4/2024  6:22 AM EST -----  Benign mammogram.  Continue routine screening with mammogram in 1 year.

## 2024-09-17 ENCOUNTER — OFFICE VISIT (OUTPATIENT)
Dept: FAMILY MEDICINE CLINIC | Facility: CLINIC | Age: 44
End: 2024-09-17
Payer: COMMERCIAL

## 2024-09-17 VITALS
SYSTOLIC BLOOD PRESSURE: 120 MMHG | DIASTOLIC BLOOD PRESSURE: 80 MMHG | HEART RATE: 68 BPM | OXYGEN SATURATION: 98 % | RESPIRATION RATE: 16 BRPM | HEIGHT: 65 IN | TEMPERATURE: 98.3 F | WEIGHT: 178 LBS | BODY MASS INDEX: 29.66 KG/M2

## 2024-09-17 DIAGNOSIS — G43.809 OTHER MIGRAINE WITHOUT STATUS MIGRAINOSUS, NOT INTRACTABLE: Primary | ICD-10-CM

## 2024-09-17 PROBLEM — G43.909 MIGRAINE: Status: ACTIVE | Noted: 2024-09-17

## 2024-09-17 PROCEDURE — 99213 OFFICE O/P EST LOW 20 MIN: CPT | Performed by: NURSE PRACTITIONER

## 2024-09-17 RX ORDER — SUMATRIPTAN 50 MG/1
50 TABLET, FILM COATED ORAL ONCE AS NEEDED
Qty: 9 TABLET | Refills: 1 | Status: SHIPPED | OUTPATIENT
Start: 2024-09-17

## 2024-09-17 NOTE — PROGRESS NOTES
Ambulatory Visit  Name: Angelika Graff      : 1980      MRN: [id number]  Encounter Provider: MICHELLE Singh  Encounter Date: 2024   Encounter department: [facility]    Assessment & Plan  Other migraine without status migrainosus, not intractable  - Not well controlled.  - Prescription sent for Imitrex 50 mg to be taken at onset of migraine. Discussed side effects. Limit use to no more than 10 times per month.  - Recommend follow up in 6 weeks.   Orders:    SUMAtriptan (Imitrex) 50 mg tablet; Take 1 tablet (50 mg total) by mouth once as needed for migraine for up to 1 dose         History of Present Illness     Patient presents to office today with complaints of headache with associated sensitivity to light and sound as well as nausea. She denies any vomiting. Denies any vision changes. Has been getting the headaches about every other day for the past week. Describes them as intense. They are located at the top of her head. She denies any other concerns or complaints today.        Review of Systems   Constitutional:  Negative for fatigue and fever.   HENT:  Negative for trouble swallowing.    Eyes:  Positive for photophobia. Negative for visual disturbance.   Respiratory:  Negative for cough and shortness of breath.    Cardiovascular:  Negative for chest pain and palpitations.   Gastrointestinal:  Positive for nausea. Negative for abdominal pain and blood in stool.   Endocrine: Negative for cold intolerance and heat intolerance.   Genitourinary:  Negative for difficulty urinating and dysuria.   Musculoskeletal:  Negative for gait problem.   Skin:  Negative for rash.   Neurological:  Positive for headaches. Negative for dizziness and syncope.   Hematological:  Negative for adenopathy.   Psychiatric/Behavioral:  Negative for behavioral problems.      Past Medical History:   Diagnosis Date    Fibroid 2015    Fibroids     Sleep apnea     no current Cpap usage     Past  Surgical History:   Procedure Laterality Date    ANKLE SURGERY       SECTION      x1    MOLE REMOVAL      chest    PILONIDAL CYST EXCISION      [place] LAPAROSCOPY W/RMVL ADNEXAL STRUCTURES Bilateral 2022    Procedure: SALPINGECTOMY, LAPAROSCOPIC;  Surgeon: Yenni Zaragoza MD;  Location: [facility];  Service: Gynecology    WISDOM TOOTH EXTRACTION Bilateral      Family History   Problem Relation Age of Onset    Thyroid disease Mother     No Known Problems Father     Asthma Sister     No Known Problems Daughter     No Known Problems Daughter     Diabetes Maternal Grandmother     No Known Problems Maternal Grandfather     Diabetes Paternal Grandmother     Heart disease Paternal Grandfather     Diabetes Paternal Grandfather     Stroke Paternal Grandfather     No Known Problems Paternal Aunt     No Known Problems Paternal Aunt     No Known Problems Paternal Aunt      Social History     Tobacco Use    Smoking status: Never    Smokeless tobacco: Never   Vaping Use    Vaping status: Never Used   Substance and Sexual Activity    Alcohol use: Not Currently     Comment: Special occasions only    Drug use: Never    Sexual activity: Yes     Partners: Male     Birth control/protection: Condom Male     Current Outpatient Medications on File Prior to Visit   Medication Sig    Calcium Carbonate (CALCIUM 600 PO) Take 1 tablet by mouth daily    Cholecalciferol (Vitamin D3) 125 MCG (5000 UT) TABS Take 5,000 Units by mouth daily    Magnesium 400 MG CAPS Take 1 capsule by mouth daily    multivitamin (THERAGRAN) TABS Take 1 tablet by mouth daily     Allergies   Allergen Reactions    Chlorhexidine Rash     Immunization History   Administered Date(s) Administered    COVID-19 MODERNA VACC 0.5 ML IM 2021, 2021, 2021    INFLUENZA 10/09/2014, 10/11/2020, 10/09/2021, 10/12/2022    Tdap 2015     Objective     /80 (BP Location: Left arm, Patient Position: Sitting, Cuff Size: Standard)   Pulse 68   Temp  "98.3 °F (36.8 °C) (Tympanic)   Resp 16   Ht 5' 5\" (1.651 m)   Wt 80.7 kg (178 lb)   SpO2 98%   BMI 29.62 kg/m²     Physical Exam  Vitals and nursing note reviewed.   Constitutional:       Appearance: Normal appearance. She is well-developed.   HENT:      Head: Normocephalic and atraumatic.      Right Ear: External ear normal.      Left Ear: External ear normal.   Eyes:      Conjunctiva/sclera: Conjunctivae normal.   Cardiovascular:      Rate and Rhythm: Normal rate and regular rhythm.      Heart sounds: Normal heart sounds.   Pulmonary:      Effort: Pulmonary effort is normal.      Breath sounds: Normal breath sounds.   Musculoskeletal:         General: Normal range of motion.      Cervical back: Normal range of motion.   Skin:     General: Skin is warm and dry.   Neurological:      Mental Status: She is alert and oriented to person, place, and time.   Psychiatric:         Mood and Affect: Mood normal.         Behavior: Behavior normal.         "

## 2024-09-17 NOTE — ASSESSMENT & PLAN NOTE
- Not well controlled.  - Prescription sent for Imitrex 50 mg to be taken at onset of migraine. Discussed side effects. Limit use to no more than 10 times per month.  - Recommend follow up in 6 weeks.   Orders:    SUMAtriptan (Imitrex) 50 mg tablet; Take 1 tablet (50 mg total) by mouth once as needed for migraine for up to 1 dose

## 2024-10-29 ENCOUNTER — OFFICE VISIT (OUTPATIENT)
Dept: FAMILY MEDICINE CLINIC | Facility: CLINIC | Age: 44
End: 2024-10-29
Payer: COMMERCIAL

## 2024-10-29 VITALS
HEIGHT: 65 IN | SYSTOLIC BLOOD PRESSURE: 110 MMHG | DIASTOLIC BLOOD PRESSURE: 72 MMHG | BODY MASS INDEX: 29.66 KG/M2 | RESPIRATION RATE: 16 BRPM | WEIGHT: 178 LBS | TEMPERATURE: 97 F | HEART RATE: 88 BPM | OXYGEN SATURATION: 98 %

## 2024-10-29 DIAGNOSIS — Z12.4 CERVICAL CANCER SCREENING: ICD-10-CM

## 2024-10-29 DIAGNOSIS — G44.89 OTHER HEADACHE SYNDROME: ICD-10-CM

## 2024-10-29 DIAGNOSIS — N39.3 STRESS INCONTINENCE OF URINE: Primary | ICD-10-CM

## 2024-10-29 PROCEDURE — 99213 OFFICE O/P EST LOW 20 MIN: CPT | Performed by: FAMILY MEDICINE

## 2024-10-29 RX ORDER — GABAPENTIN 100 MG/1
100 CAPSULE ORAL 3 TIMES DAILY
Qty: 90 CAPSULE | Refills: 1 | Status: SHIPPED | OUTPATIENT
Start: 2024-10-29

## 2024-10-29 NOTE — PROGRESS NOTES
"Ambulatory Visit  Name: Angelika Graff      : 1980      MRN: [id number]  Encounter Provider: Jaime Alfaro MD  Encounter Date: 10/29/2024   Encounter department: [facility]    Assessment & Plan  Stress incontinence of urine  Not well controlled, patient has not been seen since , recommend follow up. Referral provided. Will continue to monitor.   Orders:    Ambulatory Referral to Obstetrics / Gynecology; Future    Other headache syndrome  Not well controlled, pt having headaches and numbness/tingling of L arm about 4x per week. Prescription given for gabapentin (Neurontin) and referral provided to see Neurology. Will continue to monitor.   Orders:    Ambulatory Referral to Neurology; Future    gabapentin (Neurontin) 100 mg capsule; Take 1 capsule (100 mg total) by mouth 3 (three) times a day    Cervical cancer screening  Pt has not been seen by GYN since , referral provided.   Orders:    Ambulatory Referral to Obstetrics / Gynecology; Future       History of Present Illness     DEEPA is a 44 y.o. female who presents for a 6 week follow up after being seen in the office for migraines. She was given Imitrex to take as needed for migraines. Patient reports that she did not have any more migraines after she was seen in the office and did not end up using the medication. She reports headaches that \"feel differently\" than migraines and she reports that they are associated with vertigo and numbness and tingling of the L arm. She states that they come and go about 4 times per week and last only for a few seconds. She denies changes in vision, nausea, vomiting and photophobia. She also reports increasing frequency of urination and leakage when laughing or coughing. She denies pain, burning or foul smelling urine. She has not been seen by GYN since . Pt reports that she has been trying to lose weight through changing diet and exercise habits. She denies chest pain, SOB and swelling " "of the legs.       History obtained from : patient  Review of Systems   Constitutional:  Negative for chills and fever.   HENT:  Negative for ear pain and sore throat.    Eyes:  Negative for pain and visual disturbance.   Respiratory:  Negative for cough and shortness of breath.    Cardiovascular:  Negative for chest pain and palpitations.   Gastrointestinal:  Negative for abdominal pain and vomiting.   Genitourinary:  Positive for frequency. Negative for dysuria and hematuria.   Musculoskeletal:  Negative for arthralgias and back pain.   Skin:  Negative for color change and rash.   Neurological:  Positive for numbness and headaches. Negative for seizures and syncope.   All other systems reviewed and are negative.          Objective     /72 (BP Location: Left arm, Patient Position: Sitting, Cuff Size: Standard)   Pulse 88   Temp (!) 97 °F (36.1 °C) (Tympanic)   Resp 16   Ht 5' 5\" (1.651 m)   Wt 80.7 kg (178 lb)   SpO2 98%   BMI 29.62 kg/m²     Physical Exam  Vitals and nursing note reviewed.   Constitutional:       General: She is not in acute distress.     Appearance: She is well-developed.   HENT:      Head: Normocephalic and atraumatic.      Right Ear: Ear canal and external ear normal.      Left Ear: Ear canal and external ear normal.      Nose: Nose normal. No congestion.      Mouth/Throat:      Mouth: Mucous membranes are moist.      Pharynx: No posterior oropharyngeal erythema.   Eyes:      Extraocular Movements: Extraocular movements intact.      Conjunctiva/sclera: Conjunctivae normal.      Pupils: Pupils are equal, round, and reactive to light.   Cardiovascular:      Rate and Rhythm: Normal rate and regular rhythm.      Heart sounds: No murmur heard.  Pulmonary:      Effort: Pulmonary effort is normal. No respiratory distress.      Breath sounds: Normal breath sounds. No wheezing, rhonchi or rales.   Abdominal:      Palpations: Abdomen is soft.      Tenderness: There is no abdominal " tenderness.   Musculoskeletal:         General: No swelling.      Cervical back: Neck supple.   Skin:     General: Skin is warm and dry.      Capillary Refill: Capillary refill takes less than 2 seconds.   Neurological:      Mental Status: She is alert.      Sensory: No sensory deficit.      Motor: No weakness.   Psychiatric:         Mood and Affect: Mood normal.

## 2024-11-03 PROBLEM — H35.40: Status: ACTIVE | Noted: 2018-06-08

## 2024-11-03 PROBLEM — N39.3 STRESS INCONTINENCE OF URINE: Status: ACTIVE | Noted: 2024-11-03

## 2024-11-03 PROBLEM — G44.89 OTHER HEADACHE SYNDROME: Status: ACTIVE | Noted: 2024-11-03

## 2024-11-03 NOTE — ASSESSMENT & PLAN NOTE
Not well controlled, pt having headaches and numbness/tingling of L arm about 4x per week. Prescription given for gabapentin (Neurontin) and referral provided to see Neurology. Will continue to monitor.   Orders:    Ambulatory Referral to Neurology; Future    gabapentin (Neurontin) 100 mg capsule; Take 1 capsule (100 mg total) by mouth 3 (three) times a day

## 2024-11-03 NOTE — ASSESSMENT & PLAN NOTE
Not well controlled, patient has not been seen since 2022, recommend follow up. Referral provided. Will continue to monitor.   Orders:    Ambulatory Referral to Obstetrics / Gynecology; Future

## 2024-11-22 ENCOUNTER — OFFICE VISIT (OUTPATIENT)
Dept: OBGYN CLINIC | Facility: CLINIC | Age: 44
End: 2024-11-22
Payer: COMMERCIAL

## 2024-11-22 VITALS
DIASTOLIC BLOOD PRESSURE: 62 MMHG | BODY MASS INDEX: 29.49 KG/M2 | SYSTOLIC BLOOD PRESSURE: 112 MMHG | HEART RATE: 82 BPM | OXYGEN SATURATION: 99 % | HEIGHT: 65 IN | WEIGHT: 177 LBS

## 2024-11-22 DIAGNOSIS — N39.46 MIXED INCONTINENCE URGE AND STRESS: ICD-10-CM

## 2024-11-22 DIAGNOSIS — Z12.11 COLON CANCER SCREENING: ICD-10-CM

## 2024-11-22 DIAGNOSIS — Z12.31 ENCOUNTER FOR SCREENING MAMMOGRAM FOR MALIGNANT NEOPLASM OF BREAST: Primary | ICD-10-CM

## 2024-11-22 DIAGNOSIS — N39.3 STRESS INCONTINENCE OF URINE: ICD-10-CM

## 2024-11-22 DIAGNOSIS — Z01.419 ENCOUNTER FOR ANNUAL ROUTINE GYNECOLOGICAL EXAMINATION: ICD-10-CM

## 2024-11-22 DIAGNOSIS — Z12.4 CERVICAL CANCER SCREENING: ICD-10-CM

## 2024-11-22 PROCEDURE — S0612 ANNUAL GYNECOLOGICAL EXAMINA: HCPCS | Performed by: OBSTETRICS & GYNECOLOGY

## 2024-11-22 PROCEDURE — G0476 HPV COMBO ASSAY CA SCREEN: HCPCS | Performed by: OBSTETRICS & GYNECOLOGY

## 2024-11-22 PROCEDURE — G0145 SCR C/V CYTO,THINLAYER,RESCR: HCPCS | Performed by: OBSTETRICS & GYNECOLOGY

## 2024-11-22 NOTE — PROGRESS NOTES
Subjective      Angelika Graff is a 44 y.o. female who presents for annual exam.      Chief Complaint   Patient presents with    Gynecologic Exam     Yearly. Frequent urination, no burning, no itching, no odor. Been going on for a few months. Notices it more when drinking coffee.     Occasional leakage of urine with stress - once a week  Urinary urgency/frequency   Reports regular cycles at baseline - first day heaviest   Known subserosal fibroid 9 cm       Last Pap: 2022 NILM/HPV neg   Last mammogram: 2024 BIRADS1  Colorectal cancer screening: Not on file      HPV vaccine completed:no  Current contraception:  bilateral salpingectomy   History of abnormal Pap smear: no  History of abnormal mammogram: no      Family history of uterine or ovarian cancer: no  Family history of breast cancer: no  Family history of colon cancer: no      Menstrual History:  OB History          3    Para   3    Term   3            AB        Living   3         SAB        IAB        Ectopic        Multiple        Live Births   3                    Patient's last menstrual period was 2024 (approximate).         Past Medical History:   Diagnosis Date    Fibroid 2015    Fibroids     Sleep apnea     no current Cpap usage     Past Surgical History:   Procedure Laterality Date    ANKLE SURGERY       SECTION      x1    MOLE REMOVAL      chest    PILONIDAL CYST EXCISION      PA LAPAROSCOPY W/RMVL ADNEXAL STRUCTURES Bilateral 2022    Procedure: SALPINGECTOMY, LAPAROSCOPIC;  Surgeon: Yenni Zaragoza MD;  Location: [facility];  Service: Gynecology    WISDOM TOOTH EXTRACTION Bilateral      Family History   Problem Relation Age of Onset    Thyroid disease Mother     No Known Problems Father     Asthma Sister     No Known Problems Daughter     No Known Problems Daughter     Diabetes Maternal Grandmother     No Known Problems Maternal Grandfather     Diabetes Paternal Grandmother     Heart disease Paternal  Grandfather     Diabetes Paternal Grandfather     Stroke Paternal Grandfather     No Known Problems Paternal Aunt     No Known Problems Paternal Aunt     No Known Problems Paternal Aunt        Social History     Tobacco Use    Smoking status: Never    Smokeless tobacco: Never   Vaping Use    Vaping status: Never Used   Substance Use Topics    Alcohol use: Not Currently     Comment: Special occasions only    Drug use: Never          Current Outpatient Medications:     Calcium Carbonate (CALCIUM 600 PO), Take 1 tablet by mouth daily, Disp: , Rfl:     Cholecalciferol (Vitamin D3) 125 MCG (5000 UT) TABS, Take 5,000 Units by mouth daily, Disp: , Rfl:     gabapentin (Neurontin) 100 mg capsule, Take 1 capsule (100 mg total) by mouth 3 (three) times a day, Disp: 90 capsule, Rfl: 1    Magnesium 400 MG CAPS, Take 1 capsule by mouth daily, Disp: , Rfl:     multivitamin (THERAGRAN) TABS, Take 1 tablet by mouth daily, Disp: , Rfl:     SUMAtriptan (Imitrex) 50 mg tablet, Take 1 tablet (50 mg total) by mouth once as needed for migraine for up to 1 dose (Patient not taking: Reported on 11/22/2024), Disp: 9 tablet, Rfl: 1    Current Facility-Administered Medications:     hydrocortisone 2.5 % cream, , Topical, 4x Daily PRN, Yenni Zaragoza MD    Allergies   Allergen Reactions    Chlorhexidine Rash           Review of Systems   Constitutional:  Negative for appetite change, chills and fever.   Eyes:  Negative for visual disturbance.   Respiratory:  Negative for cough, chest tightness and shortness of breath.    Cardiovascular:  Negative for chest pain.   Gastrointestinal:  Negative for abdominal distention, abdominal pain, constipation, diarrhea, nausea and vomiting.   Endocrine: Negative for cold intolerance and heat intolerance.   Genitourinary:  Negative for difficulty urinating, dyspareunia, dysuria, frequency, genital sores, pelvic pain, urgency, vaginal bleeding, vaginal discharge and vaginal pain.   Musculoskeletal:   "Negative for arthralgias.   Neurological:  Negative for light-headedness and headaches.   Hematological:  Does not bruise/bleed easily.   Psychiatric/Behavioral:  Negative for behavioral problems.    All other systems reviewed and are negative.      /62 (BP Location: Right arm, Patient Position: Sitting, Cuff Size: Standard)   Pulse 82   Ht 5' 5\" (1.651 m)   Wt 80.3 kg (177 lb)   LMP 11/02/2024 (Approximate)   SpO2 99%   BMI 29.45 kg/m²         Physical Exam  Constitutional:       General: She is not in acute distress.     Appearance: Normal appearance.   Genitourinary:      Vulva, bladder and urethral meatus normal.      No lesions in the vagina.      Right Labia: No rash, tenderness, lesions or skin changes.     Left Labia: No tenderness, lesions, skin changes or rash.     No labial fusion noted.      No inguinal adenopathy present in the right or left side.     No vaginal discharge, erythema, tenderness, bleeding or ulceration.      No vaginal prolapse present.       Right Adnexa: not tender, not full and no mass present.     Left Adnexa: not tender, not full and no mass present.     No cervical motion tenderness, discharge, friability, lesion or polyp.      Uterus is enlarged and irregular.      Uterus is not fixed or tender.      No uterine mass detected.     Uterus exam comments: Fibroid palpable .      Uterus is anteverted.      Pelvic exam was performed with patient in the lithotomy position.   Breasts:     Right: No swelling, bleeding, inverted nipple, mass, nipple discharge, skin change or tenderness.      Left: No swelling, bleeding, inverted nipple, mass, nipple discharge, skin change or tenderness.   HENT:      Head: Normocephalic and atraumatic.   Neck:      Thyroid: No thyromegaly.   Cardiovascular:      Rate and Rhythm: Normal rate and regular rhythm.   Pulmonary:      Effort: Pulmonary effort is normal. No accessory muscle usage or respiratory distress.   Abdominal:      General: There " is no distension.      Palpations: Abdomen is soft.      Tenderness: There is no abdominal tenderness. There is no guarding or rebound.   Musculoskeletal:         General: Normal range of motion.      Cervical back: Normal range of motion and neck supple.   Lymphadenopathy:      Upper Body:      Right upper body: No supraclavicular or axillary adenopathy.      Left upper body: No supraclavicular or axillary adenopathy.      Lower Body: No right inguinal and no right inguinal adenopathy. No left inguinal and no left inguinal adenopathy.   Neurological:      General: No focal deficit present.      Mental Status: She is alert.   Skin:     General: Skin is warm and dry.      Findings: No erythema.   Psychiatric:         Mood and Affect: Mood normal.         Behavior: Behavior normal.   Vitals and nursing note reviewed. Exam conducted with a chaperone present.               Mixed incontinence urge and stress  Reviewed lifestyle measures and tx modalities, she will f/u if she desires referral to urogyn     Encounter for annual routine gynecological examination  - Discussed ACOG guidelines for pap smear screening frequency: performed today given no pap on file prior to 2022   - Discussed healthy lifestyle recommendations for diet, exercise and self breast awareness.  - Discussed ACOG recommendations for screening mammograms: up to date/not indicated today, ordered for 2025   - Discussed age based recommendations for adequate calcium and vitamin D intake. No additional osteoporosis screening indicated at this time.  - Discussed ACOG recommendations for colon cancer screening: interested in Cologuard, ordered for next year when she is 46 y/o   - Safe sex practices were discussed and STI testing was not desired by the patient  - Contraceptive options were reviewed: s/p bilateral salpingectomy   - Routine follow up in 1 year was recommended or sooner as needed. All questions and concerns were addressed.

## 2024-11-22 NOTE — ASSESSMENT & PLAN NOTE
- Discussed ACOG guidelines for pap smear screening frequency: performed today given no pap on file prior to 2022   - Discussed healthy lifestyle recommendations for diet, exercise and self breast awareness.  - Discussed ACOG recommendations for screening mammograms: up to date/not indicated today, ordered for 2025   - Discussed age based recommendations for adequate calcium and vitamin D intake. No additional osteoporosis screening indicated at this time.  - Discussed ACOG recommendations for colon cancer screening: interested in Cologuard, ordered for next year when she is 46 y/o   - Safe sex practices were discussed and STI testing was not desired by the patient  - Contraceptive options were reviewed: s/p bilateral salpingectomy   - Routine follow up in 1 year was recommended or sooner as needed. All questions and concerns were addressed.

## 2024-11-26 NOTE — PROGRESS NOTES
Ambulatory Visit  Name: Angelika Graff      : 1980      MRN: [id number]  Encounter Provider: MICHELLE Parra  Encounter Date: 2024   Encounter department: NEUROLOGY [facility]    Assessment & Plan   Assessment & Plan  Chronic migraine with aura without status migrainosus, not intractable  She started experiencing migraine headaches around the age of 37.  She started getting concerned about 2 to 3 months ago when her headaches started becoming more frequent and the associated symptoms became concerning for her.  She does experience dizziness with the headaches but she also experiences an aura of tingling/numbness sensation down her arms and sometimes down her legs it can be unilateral or bilateral.  She also describes a visual aura where she sees zigzag lines in her vision.  She experiences chronic fatigue.  She was recently diagnosed with obstructive sleep apnea but is not currently sleeping with the CPAP machine.  Her headaches do tend to start in the morning and gradually worsened throughout the day.  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  She was taking magnesium but not on a regular basis.  She will generally take Motrin when she has a migraine which is usually effective for her.  Her family doctor prescribed Imitrex but she has not tried it yet because she wanted to make sure she was at home the first time she tried the medication in case she experiences any side effects.  She has not had any brain imaging or further workup at this time.  She is currently experiencing migraine headaches so I offered her a Toradol injection in the office today which she declined.  She would like to go home and try taking the Imitrex which is reasonable.  Workup:  Due to increased frequency and severity of headaches and migraines I recommend further evaluation with MRI brain without contrast to rule out structural or  treatable causes of symptoms   Preventative:  We discussed headache hygiene and lifestyle factors that may improve headaches  We discussed over-the-counter supplements including magnesium and B2 at 400 mg each daily  Currently on through other providers: Gabapentin  Past/ failed/contraindicated: None  Future options: Propranolol, amitriptyline, Topamax, CGRP med, botox  Acute:  Discussed not taking over-the-counter or prescription pain medications more than 3 days per week to prevent medication overuse/rebound headache  Start Imitrex at the earliest onset of a migraine.  May repeat again in 2 hours if not completely headache free. No more than 2 tabs in 24 hours  Currently on through other providers: None  Past/ failed/contraindicated: None  Future options:  Other triptan (Maxalt), ubrelvy, reyvow, nurtec    Orders:    Ambulatory Referral to Neurology    MRI brain without contrast; Future    Patient should follow up in 2 months, or I would be happy to see her sooner if needed.  Patient should call the office or send a [employer] message with any questions or concerns.  Patient should present to the nearest emergency department with any concerning symptoms.     History of Present Illness     We had the pleasure of evaluating Angelika in neurological consultation today. she is a 44 y.o. year-old female who presents today for evaluation of headaches.     She started experiencing migraine headaches around the age of 37.  She started getting concerned about 2 to 3 months ago when her headaches started becoming more frequent and the associated symptoms became concerning for her.  She does experience dizziness with the headaches but she also experiences an aura of tingling/numbness sensation down her arms and sometimes down her legs it can be unilateral or bilateral.  She also describes a visual aura where she sees zigzag lines in her vision.  She experiences chronic fatigue.  She was recently diagnosed with obstructive sleep  apnea but is not currently sleeping with the CPAP machine.  Her headaches do tend to start in the morning and gradually worsened throughout the day.  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  She was taking magnesium but not on a regular basis.  She will generally take Motrin when she has a migraine which is usually effective for her.  Her family doctor prescribed Imitrex but she has not tried it yet because she wanted to make sure she was at home the first time she tried the medication in case she experiences any side effects.    Headaches started at what age? 37 years old  How often do the headaches occur? daily  What time of the day do the headaches start?  Begin in the morning and worsen throughout the day.   How long do the headaches last? Hours to days  Are you ever headache free? Yes    Aura? with aura zig zag line and numbness and tingling in her arm and leg typically one sided but can be on both      Last eye exam: annually.     Where is your headache located and pain quality? Bilateral temples, vertex, bilateral occiput and occasional neck pain, throbbing, pulsing, pressure   What is the intensity of pain? Average: 5/10, worst 10/10    Associated symptoms:   [x] Nausea       [] Vomiting        [] Diarrhea  [] Insomnia    [x] Stiff or sore neck   [x] Problems with concentration  [x] Photophobia     [x]Phonophobia      [] Osmophobia  [] Blurred vision   [] Prefer quiet, dark room  [x] Light-headed or dizzy     [] Tinnitus   [x] Hands or feet tingle or feel numb/paresthesias    [] Ptosis      [] Facial droop  [] Lacrimation  [] Nasal congestion/rhinorrhea   [] Flushing of face    Things that make the headache worse? Bending over.  Lying over is helpful     Headache triggers:  stress, turning her head a certain way    Have you seen someone else for headaches or pain? No. PCP prescribed imitrex   Have you had trigger point injection  performed and how often? No  Have you had Botox injection performed and how often? No   Have you had epidural injections or transforaminal injections performed? No  Are you current pregnant or planning on getting pregnant? No, salpingectomy   Have you ever had any Brain imaging? no    LIFESTYLE  Sleep  Averages: 8 hours   Problems falling asleep?: No  Problems staying asleep?: No  Do you snore while asleep?Yes  Do you wake up with headaches? Yes  Ever evaluated for sleep apnea? Yes, recently diagnosed with severe sleep apnea. Has she seen sleep medicine?  Physical activity: started working out again  Water: 48oz per day  Caffeine: a couple per week  Mood: reports some anxiety. Speaking with a therapist     Pertinent family history:  Family history of headaches: migraine headaches in aunt  Any family history of aneurysms - No    Pertinent social history:  Work: - computer all day.   Lives with lives with their family  Illicit Drugs: denies  Alcohol/tobacco: no tobacco and occasional wine    What medications do you take or have you taken for your headaches?:    ABORTIVE:    OTC medications: motrin, advil, tylenol sometimes effective.   Prescription: Imitrex 50mg (hasn't tried)  Medications from other providers: none  Past/failed/contraindicated: none    PREVENTIVE:   OTC medications: magnesium 400mg   Prescription: none  Medications from other providers: gabapentin  Past/failed/contraindicated: none    Alternative therapies used in the past for headaches?   none    Review of Systems:  Constitutional:  Negative for appetite change and fever.   HENT: Negative.  Negative for hearing loss, tinnitus, trouble swallowing and voice change.         Phonophobia   Eyes:  Positive for visual disturbance (zig zag vision w and w/o ha). Negative for photophobia and pain.   Respiratory: Negative.  Negative for shortness of breath.    Cardiovascular: Negative.  Negative for palpitations.   Gastrointestinal:  Positive for  nausea. Negative for vomiting.   Endocrine: Negative.  Negative for cold intolerance.   Genitourinary: Negative.  Negative for dysuria, frequency and urgency.   Musculoskeletal: Negative.  Negative for myalgias and neck pain.   Skin: Negative.  Negative for rash.   Neurological:  Positive for numbness (tingle sensation going down one side of body) and headaches (everyday). Negative for dizziness, tremors, seizures, syncope, facial asymmetry, speech difficulty, weakness and light-headedness.        Brain fog   Hematological: Negative.  Does not bruise/bleed easily.   Psychiatric/Behavioral: Negative.  Negative for confusion, hallucinations and sleep disturbance (sleep apnea-not sleeping with cpap).         Extreme fatigue     Reviewed ROS as entered by medical assistant.     Current Outpatient Medications on File Prior to Visit   Medication Sig Dispense Refill    Calcium Carbonate (CALCIUM 600 PO) Take 1 tablet by mouth daily      Cholecalciferol (Vitamin D3) 125 MCG (5000 UT) TABS Take 5,000 Units by mouth daily      Magnesium 400 MG CAPS Take 1 capsule by mouth daily      multivitamin (THERAGRAN) TABS Take 1 tablet by mouth daily      SUMAtriptan (Imitrex) 50 mg tablet Take 1 tablet (50 mg total) by mouth once as needed for migraine for up to 1 dose 9 tablet 1    gabapentin (Neurontin) 100 mg capsule Take 1 capsule (100 mg total) by mouth 3 (three) times a day (Patient not taking: Reported on 12/2/2024) 90 capsule 1     Current Facility-Administered Medications on File Prior to Visit   Medication Dose Route Frequency Provider Last Rate Last Admin    hydrocortisone 2.5 % cream   Topical 4x Daily PRN Yenni Zaragoza MD          Social History     Tobacco Use    Smoking status: Never    Smokeless tobacco: Never   Vaping Use    Vaping status: Never Used   Substance and Sexual Activity    Alcohol use: Yes     Comment: Special occasions only    Drug use: Never    Sexual activity: Yes     Partners: Male     Birth  "control/protection: Condom Male       Objective     /79 (BP Location: Right arm, Patient Position: Sitting, Cuff Size: Standard)   Pulse 78   Temp 98.2 °F (36.8 °C) (Temporal)   Ht 5' 5\" (1.651 m)   Wt 81.2 kg (179 lb)   LMP 11/02/2024 (Approximate)   BMI 29.79 kg/m²     Pertinent Labs:  Lab Results   Component Value Date    SODIUM 140 01/23/2024    K 4.3 01/23/2024     01/23/2024    CO2 25 01/23/2024    AGAP 10 01/23/2024    BUN 16 01/23/2024    CREATININE 0.74 01/23/2024    GLUC 96 02/15/2022    GLUF 91 01/23/2024    CALCIUM 9.0 01/23/2024    AST 17 01/23/2024    ALT 14 01/23/2024    ALKPHOS 75 01/23/2024    TP 6.8 01/23/2024    TBILI 0.45 01/23/2024    EGFR 99 01/23/2024      Lab Results   Component Value Date    WBC 4.38 01/23/2024    HGB 13.5 01/23/2024    HCT 41.5 01/23/2024    MCV 91 01/23/2024     01/23/2024      Lab Results   Component Value Date    DYO3VHURIFSF 3.151 01/23/2024    TSH 2.07 02/15/2022     Pertinent Imaging/Test Results:  None to review    Physical Exam  Constitutional:       Appearance: Normal appearance.   HENT:      Head: Normocephalic and atraumatic.      Nose: Nose normal.      Mouth: Mucous membranes are moist.   Pulmonary:      Effort: Pulmonary effort is normal.    Skin:     General: Skin is warm and dry.   Psychiatric:        Affect normal.   Neurological Exam  Mental Status  Awake, alert and oriented to person, place and time. Speech is normal. Language is fluent with no aphasia. Attention and concentration are normal.    Cranial Nerves  CN II: Visual acuity is normal. Visual fields full to confrontation.  CN III, IV, VI: Extraocular movements intact bilaterally. Normal lids and orbits bilaterally. Pupils equal round and reactive to light bilaterally.  CN V: Facial sensation is normal.  CN VII: Full and symmetric facial movement.  CN VIII: Hearing is normal.  CN IX, X: Palate elevates symmetrically  CN XI: Shoulder shrug strength is normal.  CN XII: Tongue " midline without atrophy or fasciculations.    Motor  Normal muscle bulk throughout. Strength is 5/5 throughout all four extremities.    Sensory  Light touch is normal in upper and lower extremities.     Coordination  Right: Finger-to-nose normal.Left: Finger-to-nose normal. Self corrected on left.    Gait  Casual gait is normal including stance, stride, and arm swing.      Administrative Statements   I have spent a total time of 40 minutes in caring for this patient on the day of the visit/encounter including Diagnostic results, Prognosis, Risks and benefits of tx options, Instructions for management, Patient and family education, Importance of tx compliance, Risk factor reductions, Impressions, Counseling / Coordination of care, Documenting in the medical record, Reviewing / ordering tests, medicine, procedures  , and Obtaining or reviewing history  .

## 2024-11-26 NOTE — PATIENT INSTRUCTIONS
Additional Testing:    -I am recommending further Neurodiagnostic workup at this time:  MRI Brain ordered    Headache/migraine treatment:    Prevention: To take every day to help prevent headaches - not to take at the time of headache  -Over the counter preventive supplements for headaches/migraines (if you try, try for 3 months straight):  -Magnesium 400mg daily (If any diarrhea or upset stomach, decrease dose as tolerated)  -Riboflavin (Vitamin B2) 400mg daily (may make your urine bright/neon yellow)  - All supplements can be purchased online    Abortive: For immediate treatment of a headache/migraine:  -Start Imitrex at the earliest onset of a headache.  May repeat again in 2 hours if not completely headache free.  No more than 2 within 24 hours.  -It is ok to take ibuprofen, acetaminophen or naproxen (Advil, Tylenol,  Aleve, Excedrin) if they help your headaches you should limit these to No more than 3 times a week to avoid medication overuse/rebound headaches.     Lifestyle Recommendations:  -Remain well-hydrated drinking at least 48 to 64 ounces of noncaffeinated beverages per day in addition to anything caffeinated. -It is important to eat meals throughout the day and not go long periods of time between eating.  -Getting adequate rest is also very important for migraine prevention (aim for 7-8 hours per night).     -Regular exercise is also beneficial for headache prevention.  I would encourage at the least 5 days of physical exercise weekly for at least 30 minutes.   -I would like for them to keep track of their migraines using an application on their phone or calendar as they see fit. Phone applications: Migraine Donny or Migraine Diary.    Education and Follow-up:  -Please call or send a [employer] message with any questions or concerns. Please present to the emergency room with any concerning symptoms such as: worst headache of your life, sudden painless loss of vision or double vision, difficulty speaking  or swallowing, vertigo/room spinning that does not quickly resolve, or weakness/numbness/loss of coordination affecting 1 side of the face or body.  -Follow up in 2 months or sooner if needed.

## 2024-11-27 LAB
LAB AP GYN PRIMARY INTERPRETATION: NORMAL
Lab: NORMAL

## 2024-12-02 ENCOUNTER — CONSULT (OUTPATIENT)
Dept: NEUROLOGY | Facility: CLINIC | Age: 44
End: 2024-12-02
Payer: COMMERCIAL

## 2024-12-02 ENCOUNTER — RESULTS FOLLOW-UP (OUTPATIENT)
Dept: OBGYN CLINIC | Facility: CLINIC | Age: 44
End: 2024-12-02

## 2024-12-02 VITALS
TEMPERATURE: 98.2 F | WEIGHT: 179 LBS | HEIGHT: 65 IN | SYSTOLIC BLOOD PRESSURE: 114 MMHG | DIASTOLIC BLOOD PRESSURE: 79 MMHG | HEART RATE: 78 BPM | BODY MASS INDEX: 29.82 KG/M2

## 2024-12-02 DIAGNOSIS — G43.E09 CHRONIC MIGRAINE WITH AURA WITHOUT STATUS MIGRAINOSUS, NOT INTRACTABLE: Primary | ICD-10-CM

## 2024-12-02 DIAGNOSIS — G44.89 OTHER HEADACHE SYNDROME: ICD-10-CM

## 2024-12-02 PROCEDURE — 99204 OFFICE O/P NEW MOD 45 MIN: CPT

## 2024-12-02 NOTE — ASSESSMENT & PLAN NOTE
She started experiencing migraine headaches around the age of 37.  She started getting concerned about 2 to 3 months ago when her headaches started becoming more frequent and the associated symptoms became concerning for her.  She does experience dizziness with the headaches but she also experiences an aura of tingling/numbness sensation down her arms and sometimes down her legs it can be unilateral or bilateral.  She also describes a visual aura where she sees zigzag lines in her vision.  She experiences chronic fatigue.  She was recently diagnosed with obstructive sleep apnea but is not currently sleeping with the CPAP machine.  Her headaches do tend to start in the morning and gradually worsened throughout the day.  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  She was taking magnesium but not on a regular basis.  She will generally take Motrin when she has a migraine which is usually effective for her.  Her family doctor prescribed Imitrex but she has not tried it yet because she wanted to make sure she was at home the first time she tried the medication in case she experiences any side effects.  She has not had any brain imaging or further workup at this time.  She is currently experiencing migraine headaches so I offered her a Toradol injection in the office today which she declined.  She would like to go home and try taking the Imitrex which is reasonable.  Workup:  Due to increased frequency and severity of headaches and migraines I recommend further evaluation with MRI brain without contrast to rule out structural or treatable causes of symptoms   Preventative:  We discussed headache hygiene and lifestyle factors that may improve headaches  We discussed over-the-counter supplements including magnesium and B2 at 400 mg each daily  Currently on through other providers: Gabapentin  Past/ failed/contraindicated: None  Future options:  Propranolol, amitriptyline, Topamax, CGRP med, botox  Acute:  Discussed not taking over-the-counter or prescription pain medications more than 3 days per week to prevent medication overuse/rebound headache  Start Imitrex at the earliest onset of a migraine.  May repeat again in 2 hours if not completely headache free. No more than 2 tabs in 24 hours  Currently on through other providers: None  Past/ failed/contraindicated: None  Future options:  Other triptan (Maxalt), ubrelvy, reyvow, nurtec    Orders:    Ambulatory Referral to Neurology    MRI brain without contrast; Future

## 2024-12-02 NOTE — PROGRESS NOTES
Review of Systems   Constitutional:  Negative for appetite change and fever.   HENT: Negative.  Negative for hearing loss, tinnitus, trouble swallowing and voice change.         Phonophobia   Eyes:  Positive for visual disturbance (zig zag vision w and w/o ha). Negative for photophobia and pain.   Respiratory: Negative.  Negative for shortness of breath.    Cardiovascular: Negative.  Negative for palpitations.   Gastrointestinal:  Positive for nausea. Negative for vomiting.   Endocrine: Negative.  Negative for cold intolerance.   Genitourinary: Negative.  Negative for dysuria, frequency and urgency.   Musculoskeletal: Negative.  Negative for myalgias and neck pain.   Skin: Negative.  Negative for rash.   Neurological:  Positive for numbness (tingle sensation going down one side of body) and headaches (everyday). Negative for dizziness, tremors, seizures, syncope, facial asymmetry, speech difficulty, weakness and light-headedness.        Brain fog   Hematological: Negative.  Does not bruise/bleed easily.   Psychiatric/Behavioral: Negative.  Negative for confusion, hallucinations and sleep disturbance (sleep apnea-not sleeping with cpap).         Extreme fatigue

## 2024-12-10 ENCOUNTER — HOSPITAL ENCOUNTER (OUTPATIENT)
Dept: RADIOLOGY | Facility: IMAGING CENTER | Age: 44
Discharge: HOME | End: 2024-12-10
Payer: COMMERCIAL

## 2024-12-10 DIAGNOSIS — G43.E09 CHRONIC MIGRAINE WITH AURA WITHOUT STATUS MIGRAINOSUS, NOT INTRACTABLE: ICD-10-CM

## 2024-12-10 PROCEDURE — 70551 MRI BRAIN STEM W/O DYE: CPT

## 2024-12-11 ENCOUNTER — RESULTS FOLLOW-UP (OUTPATIENT)
Dept: NEUROLOGY | Facility: CLINIC | Age: 44
End: 2024-12-11

## 2024-12-22 PROBLEM — Z01.419 ENCOUNTER FOR ANNUAL ROUTINE GYNECOLOGICAL EXAMINATION: Status: RESOLVED | Noted: 2024-11-22 | Resolved: 2024-12-22

## 2025-02-18 PROBLEM — G43.E09 CHRONIC MIGRAINE WITH AURA WITHOUT STATUS MIGRAINOSUS, NOT INTRACTABLE: Status: ACTIVE | Noted: 2024-09-17

## 2025-02-19 ENCOUNTER — OFFICE VISIT (OUTPATIENT)
Dept: NEUROLOGY | Facility: CLINIC | Age: 45
End: 2025-02-19
Payer: COMMERCIAL

## 2025-02-19 VITALS
DIASTOLIC BLOOD PRESSURE: 80 MMHG | WEIGHT: 179 LBS | TEMPERATURE: 98.1 F | BODY MASS INDEX: 29.82 KG/M2 | HEIGHT: 65 IN | OXYGEN SATURATION: 98 % | SYSTOLIC BLOOD PRESSURE: 118 MMHG | RESPIRATION RATE: 16 BRPM | HEART RATE: 77 BPM

## 2025-02-19 DIAGNOSIS — G43.E09 CHRONIC MIGRAINE WITH AURA WITHOUT STATUS MIGRAINOSUS, NOT INTRACTABLE: Primary | ICD-10-CM

## 2025-02-19 PROCEDURE — 99214 OFFICE O/P EST MOD 30 MIN: CPT

## 2025-02-19 RX ORDER — RIMEGEPANT SULFATE 75 MG/75MG
TABLET, ORALLY DISINTEGRATING ORAL
Qty: 8 TABLET | Refills: 3 | Status: SHIPPED | OUTPATIENT
Start: 2025-02-19

## 2025-02-19 NOTE — PATIENT INSTRUCTIONS
Additional Testing:    -follow up with sleep medicine regarding sleep apnea    Headache/migraine treatment:    Prevention: To take every day to help prevent headaches - not to take at the time of headache  -Over the counter preventive supplements for headaches/migraines (if you try, try for 3 months straight):  -Magnesium 400mg daily (If any diarrhea or upset stomach, decrease dose as tolerated)  -Riboflavin (Vitamin B2) 400mg daily (may make your urine bright/neon yellow)  - All supplements can be purchased online    Abortive: For immediate treatment of a headache/migraine:  -Stop Imitrex   -Start Nurtec 75mg at the earliest onset of a headache.  No more than 1 within 24 hours.  -It is ok to take ibuprofen, acetaminophen or naproxen (Advil, Tylenol,  Aleve, Excedrin) if they help your headaches you should limit these to No more than 3 times a week to avoid medication overuse/rebound headaches.     Lifestyle Recommendations:  -Remain well-hydrated drinking at least 48 to 64 ounces of noncaffeinated beverages per day in addition to anything caffeinated. -It is important to eat meals throughout the day and not go long periods of time between eating.  -Getting adequate rest is also very important for migraine prevention (aim for 7-8 hours per night).     -Regular exercise is also beneficial for headache prevention.  I would encourage at the least 5 days of physical exercise weekly for at least 30 minutes.   -I would like for them to keep track of their migraines using an application on their phone or calendar as they see fit. Phone applications: Migraine Donny or Migraine Diary.    Education and Follow-up:  -Please call or send a My [facility] message with any questions or concerns. Please present to the emergency room with any concerning symptoms such as: worst headache of your life, sudden painless loss of vision or double vision, difficulty speaking or swallowing, vertigo/room spinning that does not quickly resolve, or  weakness/numbness/loss of coordination affecting 1 side of the face or body.  -Follow up in 4 months or sooner if needed.

## 2025-02-19 NOTE — ASSESSMENT & PLAN NOTE
"She reports doing much better and her headaches have decreased in frequency and intensity.  She only experienced about 1 migraine per month or less since I saw her last.  Her associated symptoms of dizziness, numbness/tingling, and zig/zag blurry vision episodes have decreased along with the headache frequency. She is not currently on a prescription preventative medication but does report taking vitamins, notably magnesium.  She tried using the Imitrex 1x since her last visit but did not like the way that it made her feel.  Made her feel \"funny in her chest\" and she was so tired she could not have taken this medication if she was not at home and near her bed.  She prefers not to use it again. We discussed alternative options that will hopefully be efffective for her with limited to no side effects.  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  We reviewed her brain MRI which came back without abnormality.    Workup:  12/10/24 MRI Brain: normal  Additional testing:  Recommending sleep study to rule out sleep apnea  Preventative:  We discussed headache hygiene and lifestyle factors that may improve headaches  We discussed over-the-counter supplements including magnesium and B2 at 400 mg each daily  Currently on through other providers: Gabapentin  Past/ failed/contraindicated: None  Future options: Propranolol, amitriptyline, Topamax, CGRP med, botox  Acute:  Discussed not taking over-the-counter or prescription pain medications more than 3 days per week to prevent medication overuse/rebound headache  Stop Imitrex  Start Nurtec 75mg at the earliest onset of a migraine.  No more than 1 tab in 24 hours  Currently on through other providers: None  Past/ failed/contraindicated: Imitrex (side effects)  Future options:  ubrelvy, reyvow  Orders:    rimegepant sulfate (Nurtec) 75 mg TBDP; Take one NURTEC 75 mg at onset under tongue. Limit 1 in 24 " hours. 8 a month.

## 2025-02-19 NOTE — PROGRESS NOTES
"Ambulatory Visit  Name: Angelika Graff      : 1980      MRN: [id number]  Encounter Provider: MICHELLE Parra  Encounter Date: 2025   Encounter department: NEUROLOGY [facility]    Assessment & Plan   Assessment & Plan  Chronic migraine with aura without status migrainosus, not intractable  She reports doing much better and her headaches have decreased in frequency and intensity.  She only experienced about 1 migraine per month or less since I saw her last.  Her associated symptoms of dizziness, numbness/tingling, and zig/zag blurry vision episodes have decreased along with the headache frequency. She is not currently on a prescription preventative medication but does report taking vitamins, notably magnesium.  She tried using the Imitrex 1x since her last visit but did not like the way that it made her feel.  Made her feel \"funny in her chest\" and she was so tired she could not have taken this medication if she was not at home and near her bed.  She prefers not to use it again. We discussed alternative options that will hopefully be efffective for her with limited to no side effects.  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  We reviewed her brain MRI which came back without abnormality.    Workup:  12/10/24 MRI Brain: normal  Additional testing:  Recommending sleep study to rule out sleep apnea  Preventative:  We discussed headache hygiene and lifestyle factors that may improve headaches  We discussed over-the-counter supplements including magnesium and B2 at 400 mg each daily  Currently on through other providers: Gabapentin  Past/ failed/contraindicated: None  Future options: Propranolol, amitriptyline, Topamax, CGRP med, botox  Acute:  Discussed not taking over-the-counter or prescription pain medications more than 3 days per week to prevent medication overuse/rebound headache  Stop " Imitrex  Start Nurtec 75mg at the earliest onset of a migraine.  No more than 1 tab in 24 hours  Currently on through other providers: None  Past/ failed/contraindicated: Imitrex (side effects)  Future options:  ubrelvy, reyvow  Orders:    rimegepant sulfate (Nurtec) 75 mg TBDP; Take one NURTEC 75 mg at onset under tongue. Limit 1 in 24 hours. 8 a month.    History of Present Illness       We had the pleasure of evaluating Angelika in neurological follow-up today. She is a 44 y.o. year-old female who presents today for evaluation of headaches.     Consult with me 12/2/24: She started experiencing migraine headaches around the age of 37.  She started getting concerned about 2 to 3 months ago when her headaches started becoming more frequent and the associated symptoms became concerning for her.  She does experience dizziness with the headaches but she also experiences an aura of tingling/numbness sensation down her arms and sometimes down her legs it can be unilateral or bilateral.  She also describes a visual aura where she sees zigzag lines in her vision.  She experiences chronic fatigue.  She was recently diagnosed with obstructive sleep apnea but is not currently sleeping with the CPAP machine.  Her headaches do tend to start in the morning and gradually worsened throughout the day.  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  She was taking magnesium but not on a regular basis.  She will generally take Motrin when she has a migraine which is usually effective for her.  Her family doctor prescribed Imitrex but she has not tried it yet because she wanted to make sure she was at home the first time she tried the medication in case she experiences any side effects.    Interval history as of 2/19/25:  She reports doing much better and her headaches have decreased in frequency and intensity.  She only experienced about 1 migraine per month or less  "since I saw her last.  Her associated symptoms of dizziness, numbness/tingling, and zig/zag blurry vision episodes have decreased along with the headache frequency. She is not currently on a prescription preventative medication but does report taking vitamins, notably magnesium.  She tried using the Imitrex 1x since her last visit but did not like the way that it made her feel.  Made her feel \"funny in her chest\" and she was so tired she could not have taken this medication if she was not at home and near her bed.  She prefers not to use it again.     Headaches started at what age? 37 years old  How often do the headaches occur? Daily. Now, 1-2 migraines since her last visit. Had some milder headaches that responded to motrin  What time of the day do the headaches start?  Begin in the morning and worsen throughout the day.   How long do the headaches last? Hours to days  Are you ever headache free? Yes    Aura? with aura zig zag line and numbness and tingling in her arm and leg typically one sided but can be on both      Last eye exam: annually. She is due in April    Where is your headache located and pain quality? Bilateral temples, vertex, bilateral occiput and occasional neck pain, throbbing, pulsing, pressure   What is the intensity of pain? Average: 5/10, worst 10/10    Associated symptoms:   [x] Nausea       [] Vomiting        [] Diarrhea  [] Insomnia    [x] Stiff or sore neck   [x] Problems with concentration  [x] Photophobia     [x]Phonophobia      [] Osmophobia  [] Blurred vision   [] Prefer quiet, dark room  [x] Light-headed or dizzy     [] Tinnitus   [x] Hands or feet tingle or feel numb/paresthesias    [] Ptosis      [] Facial droop  [] Lacrimation  [] Nasal congestion/rhinorrhea   [] Flushing of face    Things that make the headache worse? Bending over.  Lying over is helpful     Headache triggers:  stress, turning her head a certain way    Have you seen someone else for headaches or pain? No. PCP " prescribed imitrex   Have you had trigger point injection performed and how often? No  Have you had Botox injection performed and how often? No   Have you had epidural injections or transforaminal injections performed? No  Are you current pregnant or planning on getting pregnant? No, salpingectomy   Have you ever had any Brain imaging? Yes, MRI    LIFESTYLE  Sleep  Averages: 8 hours   Problems falling asleep?: No  Problems staying asleep?: No  Do you snore while asleep? Yes  Do you wake up with headaches? Yes  Ever evaluated for sleep apnea? Yes, recently diagnosed with severe sleep apnea. Has not made an appointment with them yet.   Physical activity: started working out again  Water: 48oz per day  Caffeine: a couple per week  Mood: reports some anxiety. Speaking with a therapist     Pertinent family history:  Family history of headaches: migraine headaches in aunt  Any family history of aneurysms - No    Pertinent social history:  Work: - computer all day.   Lives with lives with their family  Illicit Drugs: denies  Alcohol/tobacco: no tobacco and occasional wine    What medications do you take or have you taken for your headaches?:    ABORTIVE:    OTC medications: motrin, advil, tylenol sometimes effective.   Prescription: none  Medications from other providers: none  Past/failed/contraindicated: Imitrex 50mg (side effects)    PREVENTIVE:   OTC medications: magnesium 400mg   Prescription: none  Medications from other providers: gabapentin  Past/failed/contraindicated: none    Alternative therapies used in the past for headaches?   none    Review of Systems:  Constitutional: Negative.  Negative for chills and fever.   HENT: Negative.  Negative for ear pain and sore throat.    Eyes:  Positive for visual disturbance (occassional zig zag). Negative for pain.   Respiratory: Negative.  Negative for cough and shortness of breath.    Cardiovascular: Negative.  Negative for chest pain and palpitations.    Gastrointestinal: Negative.  Negative for abdominal pain and vomiting.   Endocrine: Negative.    Genitourinary: Negative.  Negative for dysuria and hematuria.   Musculoskeletal: Negative.  Negative for arthralgias and back pain.   Skin: Negative.  Negative for color change and rash.   Allergic/Immunologic: Negative.    Neurological:  Positive for dizziness (rare) and numbness (tingling in arms improved). Negative for seizures and syncope.   Hematological: Negative.    Psychiatric/Behavioral: Negative.     All other systems reviewed and are negative.    Reviewed ROS as entered by medical assistant.     Current Outpatient Medications on File Prior to Visit   Medication Sig Dispense Refill    Calcium Carbonate (CALCIUM 600 PO) Take 1 tablet by mouth daily      Cholecalciferol (Vitamin D3) 125 MCG (5000 UT) TABS Take 5,000 Units by mouth daily      Magnesium 400 MG CAPS Take 1 capsule by mouth daily      multivitamin (THERAGRAN) TABS Take 1 tablet by mouth daily      [DISCONTINUED] SUMAtriptan (Imitrex) 50 mg tablet Take 1 tablet (50 mg total) by mouth once as needed for migraine for up to 1 dose 9 tablet 1    gabapentin (Neurontin) 100 mg capsule Take 1 capsule (100 mg total) by mouth 3 (three) times a day (Patient not taking: Reported on 2/19/2025) 90 capsule 1     Current Facility-Administered Medications on File Prior to Visit   Medication Dose Route Frequency Provider Last Rate Last Admin    hydrocortisone 2.5 % cream   Topical 4x Daily PRN Yenni Zaragoza MD          Social History     Tobacco Use    Smoking status: Never    Smokeless tobacco: Never   Vaping Use    Vaping status: Never Used   Substance and Sexual Activity    Alcohol use: Yes     Comment: Special occasions only    Drug use: Never    Sexual activity: Yes     Partners: Male     Birth control/protection: Condom Male     Objective     /80 (BP Location: Left arm, Patient Position: Sitting, Cuff Size: Adult)   Pulse 77   Temp 98.1 °F (36.7  "°C) (Temporal)   Resp 16   Ht 5' 5\" (1.651 m)   Wt 81.2 kg (179 lb)   SpO2 98%   BMI 29.79 kg/m²     Pertinent Labs:  Lab Results   Component Value Date    SODIUM 140 01/23/2024    K 4.3 01/23/2024     01/23/2024    CO2 25 01/23/2024    AGAP 10 01/23/2024    BUN 16 01/23/2024    CREATININE 0.74 01/23/2024    GLUC 96 02/15/2022    GLUF 91 01/23/2024    CALCIUM 9.0 01/23/2024    AST 17 01/23/2024    ALT 14 01/23/2024    ALKPHOS 75 01/23/2024    TP 6.8 01/23/2024    TBILI 0.45 01/23/2024    EGFR 99 01/23/2024      Lab Results   Component Value Date    WBC 4.38 01/23/2024    HGB 13.5 01/23/2024    HCT 41.5 01/23/2024    MCV 91 01/23/2024     01/23/2024      Lab Results   Component Value Date    APF0TPMISISC 3.151 01/23/2024    TSH 2.07 02/15/2022     Pertinent Imaging/Test Results:  12/10/24 MRI Brain: Normal noncontrast MRI of the brain.     Physical Exam  On neurological examination the patient was awake, alert, attentive, oriented to person, place, and time. Recent and remote memory intact to conversation with no evidence of language dysfunction. Satisfactory fund of knowledge. Normal attention span and concentration.  Mood, affect and judgement are appropriate. Speech is fluent without dysarthria or aphasia. Face appears symmetric, with no obvious weakness noted.  Audition is intact to casual conversation.  Eye movements are intact.  Able to move bilateral upper extremities antigravity without difficulty.      Administrative Statements   I have spent a total time of 30 minutes in caring for this patient on the day of the visit/encounter including Diagnostic results, Prognosis, Risks and benefits of tx options, Instructions for management, Patient and family education, Importance of tx compliance, Risk factor reductions, Impressions, Counseling / Coordination of care, Documenting in the medical record, Reviewing / ordering tests, medicine, procedures  , and Obtaining or reviewing history  .   "

## 2025-02-19 NOTE — PROGRESS NOTES
Review of Systems   Constitutional: Negative.  Negative for chills and fever.   HENT: Negative.  Negative for ear pain and sore throat.    Eyes:  Positive for visual disturbance (occassional zig zag). Negative for pain.   Respiratory: Negative.  Negative for cough and shortness of breath.    Cardiovascular: Negative.  Negative for chest pain and palpitations.   Gastrointestinal: Negative.  Negative for abdominal pain and vomiting.   Endocrine: Negative.    Genitourinary: Negative.  Negative for dysuria and hematuria.   Musculoskeletal: Negative.  Negative for arthralgias and back pain.   Skin: Negative.  Negative for color change and rash.   Allergic/Immunologic: Negative.    Neurological:  Positive for dizziness (rare) and numbness (tingling in arms improved). Negative for seizures and syncope.   Hematological: Negative.    Psychiatric/Behavioral: Negative.     All other systems reviewed and are negative.

## 2025-02-20 ENCOUNTER — TELEPHONE (OUTPATIENT)
Age: 45
End: 2025-02-20

## 2025-02-20 NOTE — TELEPHONE ENCOUNTER
----- Message from MICHELLE Apodaca sent at 2/19/2025 11:25 AM EST -----  Regarding: PA  Medication prescribed for patient that likely requires PA: nurtec.  Cannot tolerate triptans secondary to chest fluttering. Thanks!    Thank you for your assistance.

## 2025-02-20 NOTE — TELEPHONE ENCOUNTER
Called [facility] to follow up regarding if Nurtec does require a prior auth, and to obtain insurance information. Unable to speak with a pharmacist. Will need to call back later.

## 2025-02-21 NOTE — TELEPHONE ENCOUNTER
Called [facility]. [facility] does require a prior auth.  ID [id number]  [facility] [id number]  Group [id number]    Prior auth submitted through [employer], [id number]. Med is approved through 2/21/2026.    Called [facility] and left a message for the pharmacist making them aware of the approval. Sent pt a message through [employer].

## 2025-03-18 ENCOUNTER — HOSPITAL ENCOUNTER (OUTPATIENT)
Dept: RADIOLOGY | Facility: IMAGING CENTER | Age: 45
Discharge: HOME | End: 2025-03-18
Payer: COMMERCIAL

## 2025-03-18 VITALS — WEIGHT: 169 LBS | BODY MASS INDEX: 28.16 KG/M2 | HEIGHT: 65 IN

## 2025-03-18 DIAGNOSIS — Z12.31 ENCOUNTER FOR SCREENING MAMMOGRAM FOR MALIGNANT NEOPLASM OF BREAST: ICD-10-CM

## 2025-03-18 PROCEDURE — 77063 BREAST TOMOSYNTHESIS BI: CPT

## 2025-03-18 PROCEDURE — 77067 SCR MAMMO BI INCL CAD: CPT

## 2025-05-23 ENCOUNTER — OFFICE VISIT (OUTPATIENT)
Dept: OBGYN CLINIC | Facility: HOSPITAL | Age: 45
End: 2025-05-23

## 2025-05-23 ENCOUNTER — HOSPITAL ENCOUNTER (OUTPATIENT)
Dept: RADIOLOGY | Facility: HOSPITAL | Age: 45
Discharge: HOME | End: 2025-05-23
Attending: ORTHOPAEDIC SURGERY
Payer: COMMERCIAL

## 2025-05-23 VITALS — BODY MASS INDEX: 29.02 KG/M2 | WEIGHT: 170 LBS | HEIGHT: 64 IN

## 2025-05-23 DIAGNOSIS — Z01.89 ENCOUNTER FOR LOWER EXTREMITY COMPARISON IMAGING STUDY: ICD-10-CM

## 2025-05-23 DIAGNOSIS — M25.571 PAIN, JOINT, ANKLE AND FOOT, RIGHT: ICD-10-CM

## 2025-05-23 DIAGNOSIS — M19.171 POST-TRAUMATIC ARTHRITIS OF RIGHT ANKLE: Primary | ICD-10-CM

## 2025-05-23 PROCEDURE — 73600 X-RAY EXAM OF ANKLE: CPT

## 2025-05-23 PROCEDURE — 73610 X-RAY EXAM OF ANKLE: CPT

## 2025-05-23 NOTE — PROGRESS NOTES
James R Lachman, M.D.  Attending, Orthopaedic Surgery  Foot and Ankle  [facility]        ORTHOPAEDIC FOOT AND ANKLE CLINIC VISIT     Assessment and Plan     Assessment & Plan  Post-traumatic arthritis of right ankle  Patient with h/o right ankle ORIF in 2010   Reports chronic intermittent pain and limited ROM, no prior treatment. Has not tried bracing or injections previously   XRs right ankle demonstrate severe right ankle osteoarthritis   WBAT in supportive sneakers with rocker bottom  Based on severity of arthritis, recommend injection under fluoroscopic guidance. Discussed that at least 3 months must surpass between injections and 4 months must surpass from last injection before surgery could be pursued  Given severity of ankle arthritis, an OTC brace is not sufficient and an [place] brace is warranted to provide support to ankle and improve function. [place] brace ordered today, reviewed with patient timeline of acquiring brace and how to break it in.  OTC pain medication as needed, rest, elevation, compression stocking for pain and swelling control    If conservative measures fail, surgical option would be right ankle replacement vs ankle fusion   RTC in 3 months for brace check   Orders:    XR ankle 3+ vw right; Future    Encounter for lower extremity comparison imaging study    Orders:    XR ankle 2 vw left; Future                History of Present Illness:     Chief Complaint:   Chief Complaint   Patient presents with    Right Ankle - Pain     In 2010 she broke her ankle. Walking barefoot bothers her. She had surgery in 2010.      Angelika Graff is a 45 y.o. female who is being seen for chronic intermittent right ankle pain. Reports h/o right ankle fracture and ORIF in 2010, surgery was in [place] (does not recall surgeon's name). Pain is intermittent, notably has pain with barefoot walking. Pain improves with exercise. Pain is localized at anterolateral ankle with minimal radiating  and described as sharp and severe. Patient denies numbness, tingling or radicular pain.  Denies history of neuropathy.  Patient does not smoke, does not have diabetes and does not take blood thinners.  Patient denies family history of anesthesia complications and has not had any complications with anesthesia.     Pain/symptom timing:  Worse during the day when active  Pain/symptom context:  Worse with activites and work  Pain/symptom modifying factors:  Rest makes better, activities make worse  Pain/symptom associated signs/symptoms: none    Prior treatment   NSAIDsNo    Injections No   Bracing/Orthotics No   Physical Therapy Yes - in  after surgery     Orthopedic Surgical History:   See below     Past Medical, Surgical and Social History:  Past Medical History:  has a past medical history of Fibroid (), Fibroids, and Sleep apnea.  Problem List: does not have any pertinent problems on file.  Past Surgical History:  has a past surgical history that includes Ankle surgery;  section; [place] tooth extraction (Bilateral); Mole removal; PILONIDAL CYST EXCISION (); and pr laparoscopy w/rmvl adnexal structures (Bilateral, 2022).  Family History: family history includes Asthma in her sister; Diabetes in her maternal grandmother, paternal grandfather, and paternal grandmother; Heart disease in her paternal grandfather; No Known Problems in her daughter, daughter, father, maternal grandfather, paternal aunt, paternal aunt, paternal aunt, and son; Stroke in her paternal grandfather; Thyroid disease in her mother.  Social History:  reports that she has never smoked. She has never used smokeless tobacco. She reports current alcohol use. She reports that she does not use drugs.  Current Medications: has a current medication list which includes the following prescription(s): calcium carbonate, vitamin d3, magnesium, multivitamin, nurtec, and gabapentin, and the following Facility-Administered Medications:  "hydrocortisone.  Allergies: is allergic to chlorhexidine.     Review of Systems:  General- denies fever/chills  HEENT- denies hearing loss or sore throat  Eyes- denies eye pain or visual disturbances, denies red eyes  Respiratory- denies cough or SOB  Cardio- denies chest pain or palpitations  GI- denies abdominal pain  Endocrine- denies urinary frequency  Urinary- denies pain with urination  Musculoskeletal- Negative except noted above  Skin- denies rashes or wounds  Neurological- denies dizziness or headache  Psychiatric- denies anxiety or difficulty concentrating    Physical Exam:   Ht 5' 4\" (1.626 m)   Wt 77.1 kg (170 lb)   BMI 29.18 kg/m²   General/Constitutional: No apparent distress: well-nourished and well developed.  Eyes: normal ocular motion  Cardio: RRR, Normal S1S2, No m/r/g  Lymphatic: No appreciable lymphadenopathy  Respiratory: Non-labored breathing, CTA b/l no w/c/r  Vascular: No edema, swelling or tenderness, except as noted in detailed exam.  Integumentary: No impressive skin lesions present, except as noted in detailed exam.  Neuro: No ataxia or tremors noted  Psych: Normal mood and affect, oriented to person, place and time. Appropriate affect.  Musculoskeletal: Normal, except as noted in detailed exam and in HPI.    Examination    Right    Gait Normal   Musculoskeletal Tender to palpation at anterolateral ankle     Skin Normal.      Nails Normal    Range of Motion  0 degrees dorsiflexion, 20 degrees plantarflexion  Subtalar motion: normal    Stability Stable    Muscle Strength 5/5 tibialis anterior  5/5 gastrocnemius-soleus  5/5 posterior tibialis  5/5 peroneal/eversion strength  5/5 EHL  5/5 FHL    Neurologic Normal    Sensation  Intact to light touch throughout sural, saphenous, superficial peroneal, deep peroneal and medial/lateral plantar nerve distributions.  [place]-Roberto 5.07 filament (10g) testing  deferred.    Cardiovascular Brisk capillary refill < 2 seconds,intact DP and PT " pulses    Special Tests None      Imaging Studies:   3 views of the right ankle were taken, reviewed and interpreted independently that demonstrate severe tibiotalar osteoarthritis, bone spurring anterior joint. Hardware in expected position without evidence of failure/loosening. Reviewed by me personally.    Scribe Attestation      I,:  Reshma Argueta PA-C am acting as a scribe while in the presence of the attending physician.:       I,:  James R Lachman, MD personally performed the services described in this documentation    as scribed in my presence.:               James R. Lachman, MD  Foot & Ankle Surgery   Department of Orthopaedic Surgery  [facility]      I personally performed the service.    James R. Lachman, MD

## 2025-05-23 NOTE — PATIENT INSTRUCTIONS
You have post-traumatic right ankle arthritis. The following treatment options were discussed:  Supportive shoes with rocker bottom  OTC anti-inflammatories   [place] brace   Steroid injections  Surgical options: ankle fusion vs ankle replacement     Today, We recommended a brace/prosthesis for you. [place] certified pedorthotists make orthotics but not braces or prosthesis.  Below are the companies in the area that make these, Please call ahead for an appointment and to ensure the company accepts your insurance. Make sure to bring the prescription given to you in the office today to the company for the brace/prosthesis.    [facility]  [address] [place] [address]  [facility] Phone: [phone]  [place] Fax: [phone]    [address]  [address]  [place] [address]  (By Appointment Only)  Directions  [address]  [address]  [place] [address]  Directions  [place] Phone: [phone]  [place] Fax: [phone]    [facility]  [address]  [facility] [address]  [phone] (fax)    [place] Location  [address], [address]  [place] [address]  [phone] (fax)    [place] Location  [address] [place] [address] [phone] (fax)    [facility] Location  [address], [address]  [facility] [address]  [phone] (fax)    [place] Location  [address]  [place] [id number] [phone] (fax)     Recommend getting a shoe with a rocker bottom (can google).  [employer], [employer], [employer] are good brands but I recommend going to a dedicate shoe store (not [employer] or [employer].) At these types of stores, they have experts that can fit you for shoes appropriate for your foot problem. Shoe choice is essential to solving/improving most types of foot  pain.  Even after a surgery, good shoes are necessary to keep the foot as comfortable as possible.    [employer]  [address] [place] [address]  [phone]    [employer]  [address], [place] [address]  [phone]    [employer]  [address] [place] [address]  [phone]    [employer]   [address]  [phone]    [employer]  [address], [place], [place] [address] [phone]    [employer]  [address] [phone]    [facility]   [address] [place] [address]  [phone]    [employer]  [address] [place], [place]  [phone]    [employer] [facility]  [address], [address]  [phone]    [facility]  [address], [place] [address] [phone]

## 2025-05-30 LAB — COLOGUARD RESULT REPORTABLE: NEGATIVE

## 2025-06-24 ENCOUNTER — TELEPHONE (OUTPATIENT)
Age: 45
End: 2025-06-24

## 2025-06-24 NOTE — TELEPHONE ENCOUNTER
Called patient back to re-schedule  cancelled FU KARTHIKEYAN macedo requesting a call back.    Thank you,

## 2025-07-23 NOTE — PATIENT INSTRUCTIONS
Additional Testing:    -You can follow up with sleep medicine regarding sleep apnea    Headache/migraine treatment:    Prevention: To take every day to help prevent headaches - not to take at the time of headache  -Over the counter preventive supplements for headaches/migraines (if you try, try for 3 months straight):  -Magnesium 400mg daily (If any diarrhea or upset stomach, decrease dose as tolerated)  -Riboflavin (Vitamin B2) 400mg daily (may make your urine bright/neon yellow)  - All supplements can be purchased online    Abortive: For immediate treatment of a headache/migraine:  -Continue Nurtec 75mg at the earliest onset of a headache.  No more than 1 within 24 hours.  -It is ok to take ibuprofen, acetaminophen or naproxen (Advil, Tylenol,  Aleve, Excedrin) if they help your headaches you should limit these to No more than 3 times a week to avoid medication overuse/rebound headaches.     Lifestyle Recommendations:  -Remain well-hydrated drinking at least 48 to 64 ounces of noncaffeinated beverages per day in addition to anything caffeinated. -It is important to eat meals throughout the day and not go long periods of time between eating.  -Getting adequate rest is also very important for migraine prevention (aim for 7-8 hours per night).     -Regular exercise is also beneficial for headache prevention.  I would encourage at the least 5 days of physical exercise weekly for at least 30 minutes.   -I would like for them to keep track of their migraines using an application on their phone or calendar as they see fit. Phone applications: Migraine Donny or Migraine Diary.    Education and Follow-up:  -Please call or send a [employer] message with any questions or concerns. Please present to the emergency room with any concerning symptoms such as: worst headache of your life, sudden painless loss of vision or double vision, difficulty speaking or swallowing, vertigo/room spinning that does not quickly resolve, or  weakness/numbness/loss of coordination affecting 1 side of the face or body.  -Follow up in 6 months or sooner if needed.

## 2025-07-23 NOTE — PROGRESS NOTES
Ambulatory Visit  Name: Angelika Graff      : 1980      MRN: [id number]  Encounter Provider: MICHELLE Parra  Encounter Date: 2025   Encounter department: [facility] [place]      Assessment & Plan  Migraine with aura and without status migrainosus, not intractable  She is doing exceptionally well from a migraine perspective.  She reports having one migraine since I saw her 5 months ago which occurred last week.  She denies any new symptoms associated with her migraines.  She believes stress is a big trigger for her and that is what likely caused her most recent migraine.  She did not experience an aura with this headache and reports that she has not had one with any of her migraines recently.  She took nurtec and it eliminated the migraine for her without any side effects.  She would like to continue with this medication for now and does not need a refill since she has been using it infrequently. She continues using magnesium and B2 daily for headache prevention. She was diagnosed with severe sleep apnea and it was recommended to discuss treatment options with sleep medicine.  She has not scheduled this appointment yet.  She tried using a CPAP machine in the past and was unable to tolerate it. She is aware of the risks involved with untreated sleep apnea and that I do recommend she discusses any possible alternative options to cpap with sleep medicine team.    Workup:  12/10/24 MRI Brain: normal  Additional testing:  None required at this time  Preventative:  We discussed headache hygiene and lifestyle factors that may improve headaches  We discussed over-the-counter supplements including magnesium and B2 at 400 mg each daily  Currently on through other providers: none  Past/ failed/contraindicated: None  Future options: Propranolol, amitriptyline, Topamax, CGRP med, botox  Acute:  Discussed not taking over-the-counter or prescription pain medications more than 3 days per  week to prevent medication overuse/rebound headache  Continue Nurtec 75mg at the earliest onset of a migraine.  No more than 1 tab in 24 hours  Currently on through other providers: None  Past/ failed/contraindicated: Imitrex 50mg (side effects)  Future options:  ubrelvy, reyvow         Severe obstructive sleep apnea         History of Present Illness       We had the pleasure of evaluating Angelika in neurological follow-up today. She is a 45 y.o. year-old female who presents today for evaluation of headaches.     Consult with me 12/2/24: She started experiencing migraine headaches around the age of 37.  She started getting concerned about 2 to 3 months ago when her headaches started becoming more frequent and the associated symptoms became concerning for her.  She does experience dizziness with the headaches but she also experiences an aura of tingling/numbness sensation down her arms and sometimes down her legs it can be unilateral or bilateral.  She also describes a visual aura where she sees zigzag lines in her vision.  She experiences chronic fatigue.  She was recently diagnosed with obstructive sleep apnea but is not currently sleeping with the CPAP machine.  Her headaches do tend to start in the morning and gradually worsened throughout the day.  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  She was taking magnesium but not on a regular basis.  She will generally take Motrin when she has a migraine which is usually effective for her.  Her family doctor prescribed Imitrex but she has not tried it yet because she wanted to make sure she was at home the first time she tried the medication in case she experiences any side effects.  OV with me 2/19/25: She reports doing much better and her headaches have decreased in frequency and intensity.  She only experienced about 1 migraine per month or less since I saw her last.  Her associated symptoms of  "dizziness, numbness/tingling, and zig/zag blurry vision episodes have decreased along with the headache frequency. She is not currently on a prescription preventative medication but does report taking vitamins, notably magnesium.  She tried using the Imitrex 1x since her last visit but did not like the way that it made her feel.  Made her feel \"funny in her chest\" and she was so tired she could not have taken this medication if she was not at home and near her bed.  She prefers not to use it again.     Interval history as of 7/24/25:  She is doing exceptionally well from a migraine perspective.  She reports having one migraine since I saw her 5 months ago which occurred last week.  She denies any new symptoms associated with her migraines.  She believes stress is a big trigger for her and that is what likely caused her most recent migraine.  She did not experience an aura with this headache and reports that she has not had one with any of her migraines recently.  She took nurtec and it eliminated the migraine for her without any side effects.  She would like to continue with this medication for now and does not need a refill since she has been using it infrequently. She continues using magnesium and B2 daily for headache prevention. She was diagnosed with severe sleep apnea and it was recommended to discuss treatment options with sleep medicine.  She has not scheduled this appointment yet.  She tried using a CPAP machine in the past and was unable to tolerate it.     Headaches started at what age? 37 years old  How often do the headaches occur? 12/2/24: Daily. 2/19/25: 1-2 migraines since her last visit. Had some milder headaches that responded to motrin. 7/24/25: 1 since last visit  What time of the day do the headaches start?  Begin in the morning and worsen throughout the day.   How long do the headaches last? Hours to days  Are you ever headache free? Yes    Aura? with aura zig zag line and numbness and tingling " in her arm and leg typically one sided but can be on both      Last eye exam: annually. She is due in April    Where is your headache located and pain quality? Bilateral temples, vertex, bilateral occiput and occasional neck pain, throbbing, pulsing, pressure   What is the intensity of pain? Average: 5/10, worst 10/10    Associated symptoms:   [x] Nausea       [] Vomiting        [] Diarrhea  [] Insomnia    [x] Stiff or sore neck   [x] Problems with concentration  [x] Photophobia     [x]Phonophobia      [] Osmophobia  [] Blurred vision   [] Prefer quiet, dark room  [x] Light-headed or dizzy     [] Tinnitus   [x] Hands or feet tingle or feel numb/paresthesias    [] Ptosis      [] Facial droop  [] Lacrimation  [] Nasal congestion/rhinorrhea   [] Flushing of face    Things that make the headache worse? Bending over.  Lying over is helpful     Headache triggers:  stress, turning her head a certain way    Have you seen someone else for headaches or pain? No. PCP prescribed imitrex   Have you had trigger point injection performed and how often? No  Have you had Botox injection performed and how often? No   Have you had epidural injections or transforaminal injections performed? No  Are you current pregnant or planning on getting pregnant? No, salpingectomy   Have you ever had any Brain imaging? Yes, MRI    LIFESTYLE  Sleep  Averages: 8 hours   Problems falling asleep?: No  Problems staying asleep?: No  Do you snore while asleep? Yes  Do you wake up with headaches? Yes  Ever evaluated for sleep apnea? Yes, recently diagnosed with severe sleep apnea. Has not made an appointment with them yet.   Physical activity: started working out again  Water: 48oz per day  Caffeine: a couple per week  Mood: reports some anxiety. Speaking with a therapist     Pertinent family history:  Family history of headaches: migraine headaches in aunt  Any family history of aneurysms - No    Pertinent social history:  Work: - computer  all day.   Lives with lives with their family  Illicit Drugs: denies  Alcohol/tobacco: no tobacco and occasional wine    What medications do you take or have you taken for your headaches?:    ABORTIVE:    OTC medications: motrin, advil, tylenol sometimes effective.   Prescription: Nurtec 75mg (effective)  Medications from other providers: none  Past/failed/contraindicated: Imitrex 50mg (side effects)    PREVENTIVE:   OTC medications: magnesium 400mg, B2 (effective)  Prescription: none  Medications from other providers:none  Past/failed/contraindicated: none    Alternative therapies used in the past for headaches?   none    Review of Systems:  Constitutional:  Negative for appetite change, fatigue and fever.   HENT: Negative.  Negative for hearing loss, tinnitus, trouble swallowing and voice change.    Eyes: Negative.  Negative for photophobia, pain and visual disturbance.   Respiratory: Negative.  Negative for shortness of breath.    Cardiovascular: Negative.  Negative for palpitations.   Gastrointestinal: Negative.  Negative for nausea and vomiting.   Endocrine: Negative.  Negative for cold intolerance.   Genitourinary: Negative.  Negative for dysuria, frequency and urgency.   Musculoskeletal:  Negative for back pain, gait problem, myalgias, neck pain and neck stiffness.   Skin: Negative.  Negative for rash.   Allergic/Immunologic: Negative.    Neurological:  Negative for dizziness, tremors, seizures, syncope, facial asymmetry, speech difficulty, weakness, light-headedness, numbness and headaches.   Hematological: Negative.  Does not bruise/bleed easily.   Psychiatric/Behavioral: Negative.  Negative for confusion, hallucinations and sleep disturbance.     Reviewed ROS as entered by medical assistant.     Current Outpatient Medications on File Prior to Visit   Medication Sig Dispense Refill    Calcium Carbonate (CALCIUM 600 PO) Take 1 tablet by mouth in the morning.      Cholecalciferol (Vitamin D3) 125 MCG (5000  UT) TABS Take 5,000 Units by mouth in the morning.      Magnesium 400 MG CAPS Take 1 capsule by mouth in the morning.      multivitamin (THERAGRAN) TABS Take 1 tablet by mouth in the morning.      rimegepant sulfate (Nurtec) 75 mg TBDP Take one NURTEC 75 mg at onset under tongue. Limit 1 in 24 hours. 8 a month. 8 tablet 3    [DISCONTINUED] gabapentin (Neurontin) 100 mg capsule Take 1 capsule (100 mg total) by mouth 3 (three) times a day (Patient not taking: Reported on 2/19/2025) 90 capsule 1     Current Facility-Administered Medications on File Prior to Visit   Medication Dose Route Frequency Provider Last Rate Last Admin    hydrocortisone 2.5 % cream   Topical 4x Daily PRN Yenni Zaragoza MD          Social History     Tobacco Use    Smoking status: Never    Smokeless tobacco: Never   Vaping Use    Vaping status: Never Used   Substance and Sexual Activity    Alcohol use: Yes     Comment: Special occasions only    Drug use: Never    Sexual activity: Yes     Partners: Male     Birth control/protection: Condom Male     Objective     /80 (BP Location: Right arm, Patient Position: Sitting, Cuff Size: Large)   Pulse 66     Pertinent Labs:  Lab Results   Component Value Date    SODIUM 140 01/23/2024    K 4.3 01/23/2024     01/23/2024    CO2 25 01/23/2024    AGAP 10 01/23/2024    BUN 16 01/23/2024    CREATININE 0.74 01/23/2024    GLUC 96 02/15/2022    GLUF 91 01/23/2024    CALCIUM 9.0 01/23/2024    AST 17 01/23/2024    ALT 14 01/23/2024    ALKPHOS 75 01/23/2024    TP 6.8 01/23/2024    TBILI 0.45 01/23/2024    EGFR 99 01/23/2024      Lab Results   Component Value Date    WBC 4.38 01/23/2024    HGB 13.5 01/23/2024    HCT 41.5 01/23/2024    MCV 91 01/23/2024     01/23/2024      Lab Results   Component Value Date    OPB2PUYGRFRW 3.151 01/23/2024    TSH 2.07 02/15/2022     Pertinent Imaging/Test Results:  12/10/24 MRI Brain: Normal noncontrast MRI of the brain.     Physical Exam  On neurological  examination the patient was awake, alert, attentive, oriented to person, place, and time. Recent and remote memory intact to conversation with no evidence of language dysfunction. Satisfactory fund of knowledge. Normal attention span and concentration.  Mood, affect and judgement are appropriate. Speech is fluent without dysarthria or aphasia. Face appears symmetric, with no obvious weakness noted.  Audition is intact to casual conversation.  Eye movements are intact.  Able to move bilateral upper extremities antigravity without difficulty.      Administrative Statements   I have spent a total time of 15 minutes in caring for this patient on the day of the visit/encounter including Diagnostic results, Prognosis, Risks and benefits of tx options, Instructions for management, Patient and family education, Importance of tx compliance, Risk factor reductions, Impressions, Counseling / Coordination of care, Documenting in the medical record, Reviewing / ordering tests, medicine, procedures  , and Obtaining or reviewing history  .

## 2025-07-24 ENCOUNTER — OFFICE VISIT (OUTPATIENT)
Dept: NEUROLOGY | Facility: CLINIC | Age: 45
End: 2025-07-24
Payer: COMMERCIAL

## 2025-07-24 VITALS — DIASTOLIC BLOOD PRESSURE: 80 MMHG | HEART RATE: 66 BPM | SYSTOLIC BLOOD PRESSURE: 106 MMHG

## 2025-07-24 DIAGNOSIS — G43.109 MIGRAINE WITH AURA AND WITHOUT STATUS MIGRAINOSUS, NOT INTRACTABLE: Primary | ICD-10-CM

## 2025-07-24 DIAGNOSIS — G47.33 SEVERE OBSTRUCTIVE SLEEP APNEA: ICD-10-CM

## 2025-07-24 PROCEDURE — 99213 OFFICE O/P EST LOW 20 MIN: CPT

## 2025-07-24 NOTE — PROGRESS NOTES
Name: Angelika Graff      : 1980      MRN: [id number]  Encounter Provider: MICHELLE Parra  Encounter Date: 2025   Encounter department: [facility]  :  Assessment & Plan      {Ambulatory Patient Instructions (Optional):27708}    History of Present Illness {?Quick Links Encounters * My Last Note * Last Note in Specialty * Snapshot * Since Last Visit * History :36298}  Migraine     Review of Systems I have personally reviewed the MA's review of systems and made changes as necessary.    {Select to insert medical history sections (Optional):77071}     Objective {?Quick Links Trend Vitals * Enter New Vitals * Results Review * Timeline (Adult) * Labs * Imaging * Cardiology * Procedures * Lung Cancer Screening * Surgical eConsent :30793}  There were no vitals taken for this visit.    Physical Exam  Neurological Exam    {Radiology Results Review (Optional):91415}    {Administrative / Billing Section (Optional):42818}

## 2025-07-24 NOTE — ASSESSMENT & PLAN NOTE
She is doing exceptionally well from a migraine perspective.  She reports having one migraine since I saw her 5 months ago which occurred last week.  She denies any new symptoms associated with her migraines.  She believes stress is a big trigger for her and that is what likely caused her most recent migraine.  She did not experience an aura with this headache and reports that she has not had one with any of her migraines recently.  She took nurtec and it eliminated the migraine for her without any side effects.  She would like to continue with this medication for now and does not need a refill since she has been using it infrequently. She continues using magnesium and B2 daily for headache prevention. She was diagnosed with severe sleep apnea and it was recommended to discuss treatment options with sleep medicine.  She has not scheduled this appointment yet.  She tried using a CPAP machine in the past and was unable to tolerate it. She is aware of the risks involved with untreated sleep apnea and that I do recommend she discusses any possible alternative options to cpap with sleep medicine team.    Workup:  12/10/24 MRI Brain: normal  Additional testing:  None required at this time  Preventative:  We discussed headache hygiene and lifestyle factors that may improve headaches  We discussed over-the-counter supplements including magnesium and B2 at 400 mg each daily  Currently on through other providers: none  Past/ failed/contraindicated: None  Future options: Propranolol, amitriptyline, Topamax, CGRP med, botox  Acute:  Discussed not taking over-the-counter or prescription pain medications more than 3 days per week to prevent medication overuse/rebound headache  Continue Nurtec 75mg at the earliest onset of a migraine.  No more than 1 tab in 24 hours  Currently on through other providers: None  Past/ failed/contraindicated: Imitrex 50mg (side effects)  Future options:  ubrelvy, reyvow

## 2025-07-28 ENCOUNTER — OFFICE VISIT (OUTPATIENT)
Dept: PODIATRY | Facility: CLINIC | Age: 45
End: 2025-07-28
Payer: COMMERCIAL

## 2025-07-28 VITALS — HEART RATE: 77 BPM | OXYGEN SATURATION: 100 % | BODY MASS INDEX: 29.88 KG/M2 | HEIGHT: 64 IN | WEIGHT: 175 LBS

## 2025-07-28 DIAGNOSIS — M19.171 POST-TRAUMATIC ARTHRITIS OF RIGHT ANKLE: Primary | ICD-10-CM

## 2025-07-28 PROCEDURE — 99203 OFFICE O/P NEW LOW 30 MIN: CPT | Performed by: PODIATRIST

## 2025-08-06 ENCOUNTER — OFFICE VISIT (OUTPATIENT)
Dept: URGENT CARE | Age: 45
End: 2025-08-06
Payer: COMMERCIAL

## 2025-08-06 VITALS
SYSTOLIC BLOOD PRESSURE: 115 MMHG | HEART RATE: 72 BPM | RESPIRATION RATE: 18 BRPM | OXYGEN SATURATION: 97 % | TEMPERATURE: 97 F | DIASTOLIC BLOOD PRESSURE: 60 MMHG

## 2025-08-06 DIAGNOSIS — U07.1 COVID: Primary | ICD-10-CM

## 2025-08-06 LAB
S PYO AG THROAT QL: NEGATIVE
SARS-COV-2 AG UPPER RESP QL IA: POSITIVE
VALID CONTROL: ABNORMAL

## 2025-08-06 PROCEDURE — 87880 STREP A ASSAY W/OPTIC: CPT | Performed by: PHYSICIAN ASSISTANT

## 2025-08-06 PROCEDURE — 87811 SARS-COV-2 COVID19 W/OPTIC: CPT | Performed by: PHYSICIAN ASSISTANT

## 2025-08-06 PROCEDURE — S9083 URGENT CARE CENTER GLOBAL: HCPCS | Performed by: PHYSICIAN ASSISTANT

## 2025-08-06 PROCEDURE — G0382 LEV 3 HOSP TYPE B ED VISIT: HCPCS | Performed by: PHYSICIAN ASSISTANT

## (undated) DEVICE — GLOVE PI ULTRA TOUCH SZ 6

## (undated) DEVICE — 3M™ TEGADERM™ TRANSPARENT FILM DRESSING FRAME STYLE, 1624W, 2-3/8 IN X 2-3/4 IN (6 CM X 7 CM), 100/CT 4CT/CASE: Brand: 3M™ TEGADERM™

## (undated) DEVICE — 5 MM CURVED DISSECTORS WITH MONOPOLAR CAUTERY: Brand: ENDOPATH

## (undated) DEVICE — TROCAR: Brand: KII® SLEEVE

## (undated) DEVICE — PREMIUM DRY TRAY LF: Brand: MEDLINE INDUSTRIES, INC.

## (undated) DEVICE — DRAPE EQUIPMENT RF WAND

## (undated) DEVICE — [HIGH FLOW INSUFFLATOR,  DO NOT USE IF PACKAGE IS DAMAGED,  KEEP DRY,  KEEP AWAY FROM SUNLIGHT,  PROTECT FROM HEAT AND RADIOACTIVE SOURCES.]: Brand: PNEUMOSURE

## (undated) DEVICE — UTERINE MANIPULATOR 4.5MM STRL

## (undated) DEVICE — SCD SEQUENTIAL COMPRESSION COMFORT SLEEVE MEDIUM KNEE LENGTH: Brand: KENDALL SCD

## (undated) DEVICE — SUT MONOCRYL 4-0 PS-2 27 IN Y426H

## (undated) DEVICE — GAUZE SPONGES,16 PLY: Brand: CURITY

## (undated) DEVICE — ENDOPATH PNEUMONEEDLE INSUFFLATION NEEDLES WITH LUER LOCK CONNECTORS 120MM: Brand: ENDOPATH

## (undated) DEVICE — BETHLEHEM UNIVERSAL GYN LAP PK: Brand: CARDINAL HEALTH

## (undated) DEVICE — TRAY FOLEY 16FR URIMETER SILICONE SURESTEP

## (undated) DEVICE — TROCAR: Brand: KII FIOS FIRST ENTRY

## (undated) DEVICE — BLUE HEAT SCOPE WARMER

## (undated) DEVICE — GLOVE INDICATOR PI UNDERGLOVE SZ 6.5 BLUE

## (undated) DEVICE — CHLORAPREP HI-LITE 26ML ORANGE

## (undated) DEVICE — INTENDED FOR TISSUE SEPARATION, AND OTHER PROCEDURES THAT REQUIRE A SHARP SURGICAL BLADE TO PUNCTURE OR CUT.: Brand: BARD-PARKER SAFETY BLADES SIZE 11, STERILE

## (undated) DEVICE — ADHESIVE SKIN HIGH VISCOSITY EXOFIN 1ML

## (undated) DEVICE — 3M™ STERI-STRIP™ REINFORCED ADHESIVE SKIN CLOSURES, R1547, 1/2 IN X 4 IN (12 MM X 100 MM), 6 STRIPS/ENVELOPE: Brand: 3M™ STERI-STRIP™